# Patient Record
Sex: FEMALE | Race: WHITE | NOT HISPANIC OR LATINO | Employment: FULL TIME | URBAN - METROPOLITAN AREA
[De-identification: names, ages, dates, MRNs, and addresses within clinical notes are randomized per-mention and may not be internally consistent; named-entity substitution may affect disease eponyms.]

---

## 2017-02-16 ENCOUNTER — TRANSCRIBE ORDERS (OUTPATIENT)
Dept: ADMINISTRATIVE | Facility: HOSPITAL | Age: 47
End: 2017-02-16

## 2017-02-16 DIAGNOSIS — Z12.31 OTHER SCREENING MAMMOGRAM: Primary | ICD-10-CM

## 2017-03-02 ENCOUNTER — TRANSCRIBE ORDERS (OUTPATIENT)
Dept: LAB | Facility: CLINIC | Age: 47
End: 2017-03-02

## 2017-03-02 ENCOUNTER — LAB (OUTPATIENT)
Dept: LAB | Facility: CLINIC | Age: 47
End: 2017-03-02
Payer: COMMERCIAL

## 2017-03-02 DIAGNOSIS — Z79.899 ENCOUNTER FOR LONG-TERM (CURRENT) USE OF OTHER MEDICATIONS: ICD-10-CM

## 2017-03-02 DIAGNOSIS — L50.9 URTICARIA, UNSPECIFIED: ICD-10-CM

## 2017-03-02 DIAGNOSIS — M35.9 UNSPECIFIED DIFFUSE CONNECTIVE TISSUE DISEASE: ICD-10-CM

## 2017-03-02 DIAGNOSIS — K21.9 GASTROESOPHAGEAL REFLUX DISEASE, ESOPHAGITIS PRESENCE NOT SPECIFIED: ICD-10-CM

## 2017-03-02 DIAGNOSIS — K21.9 GASTROESOPHAGEAL REFLUX DISEASE, ESOPHAGITIS PRESENCE NOT SPECIFIED: Primary | ICD-10-CM

## 2017-03-02 LAB
ANION GAP SERPL CALCULATED.3IONS-SCNC: 10 MMOL/L (ref 4–13)
BASOPHILS # BLD AUTO: 0.02 THOUSANDS/ΜL (ref 0–0.1)
BASOPHILS NFR BLD AUTO: 0 % (ref 0–1)
BUN SERPL-MCNC: 13 MG/DL (ref 5–25)
C3 SERPL-MCNC: 147 MG/DL (ref 90–180)
C4 SERPL-MCNC: 22 MG/DL (ref 10–40)
CALCIUM SERPL-MCNC: 8.3 MG/DL (ref 8.3–10.1)
CHLORIDE SERPL-SCNC: 102 MMOL/L (ref 100–108)
CK SERPL-CCNC: 107 U/L (ref 26–192)
CO2 SERPL-SCNC: 26 MMOL/L (ref 21–32)
CREAT SERPL-MCNC: 0.89 MG/DL (ref 0.6–1.3)
CRP SERPL QL: 11.3 MG/L
EOSINOPHIL # BLD AUTO: 0.1 THOUSAND/ΜL (ref 0–0.61)
EOSINOPHIL NFR BLD AUTO: 1 % (ref 0–6)
ERYTHROCYTE [DISTWIDTH] IN BLOOD BY AUTOMATED COUNT: 13.4 % (ref 11.6–15.1)
ERYTHROCYTE [SEDIMENTATION RATE] IN BLOOD: 5 MM/HOUR (ref 0–20)
FERRITIN SERPL-MCNC: 62 NG/ML (ref 8–388)
GFR SERPL CREATININE-BSD FRML MDRD: >60 ML/MIN/1.73SQ M
GLUCOSE SERPL-MCNC: 126 MG/DL (ref 65–140)
HCT VFR BLD AUTO: 39.2 % (ref 34.8–46.1)
HGB BLD-MCNC: 12.9 G/DL (ref 11.5–15.4)
IRON SERPL-MCNC: 101 UG/DL (ref 50–170)
LYMPHOCYTES # BLD AUTO: 2.12 THOUSANDS/ΜL (ref 0.6–4.47)
LYMPHOCYTES NFR BLD AUTO: 28 % (ref 14–44)
MCH RBC QN AUTO: 31.5 PG (ref 26.8–34.3)
MCHC RBC AUTO-ENTMCNC: 32.9 G/DL (ref 31.4–37.4)
MCV RBC AUTO: 96 FL (ref 82–98)
MISCELLANEOUS LAB TEST RESULT: NORMAL
MONOCYTES # BLD AUTO: 0.34 THOUSAND/ΜL (ref 0.17–1.22)
MONOCYTES NFR BLD AUTO: 5 % (ref 4–12)
NEUTROPHILS # BLD AUTO: 4.97 THOUSANDS/ΜL (ref 1.85–7.62)
NEUTS SEG NFR BLD AUTO: 66 % (ref 43–75)
PLATELET # BLD AUTO: 247 THOUSANDS/UL (ref 149–390)
PMV BLD AUTO: 10.9 FL (ref 8.9–12.7)
POTASSIUM SERPL-SCNC: 3.6 MMOL/L (ref 3.5–5.3)
RBC # BLD AUTO: 4.1 MILLION/UL (ref 3.81–5.12)
SODIUM SERPL-SCNC: 138 MMOL/L (ref 136–145)
T4 FREE SERPL-MCNC: 0.81 NG/DL (ref 0.76–1.46)
TSH SERPL DL<=0.05 MIU/L-ACNC: 1.72 UIU/ML (ref 0.36–3.74)
WBC # BLD AUTO: 7.55 THOUSAND/UL (ref 4.31–10.16)

## 2017-03-02 PROCEDURE — 86430 RHEUMATOID FACTOR TEST QUAL: CPT

## 2017-03-02 PROCEDURE — 86140 C-REACTIVE PROTEIN: CPT

## 2017-03-02 PROCEDURE — 85652 RBC SED RATE AUTOMATED: CPT

## 2017-03-02 PROCEDURE — 36415 COLL VENOUS BLD VENIPUNCTURE: CPT

## 2017-03-02 PROCEDURE — 80048 BASIC METABOLIC PNL TOTAL CA: CPT

## 2017-03-02 PROCEDURE — 82550 ASSAY OF CK (CPK): CPT

## 2017-03-02 PROCEDURE — 86039 ANTINUCLEAR ANTIBODIES (ANA): CPT

## 2017-03-02 PROCEDURE — 82728 ASSAY OF FERRITIN: CPT

## 2017-03-02 PROCEDURE — 83520 IMMUNOASSAY QUANT NOS NONAB: CPT

## 2017-03-02 PROCEDURE — 84165 PROTEIN E-PHORESIS SERUM: CPT

## 2017-03-02 PROCEDURE — 84439 ASSAY OF FREE THYROXINE: CPT

## 2017-03-02 PROCEDURE — 84443 ASSAY THYROID STIM HORMONE: CPT

## 2017-03-02 PROCEDURE — 86038 ANTINUCLEAR ANTIBODIES: CPT

## 2017-03-02 PROCEDURE — 82595 ASSAY OF CRYOGLOBULIN: CPT

## 2017-03-02 PROCEDURE — 86200 CCP ANTIBODY: CPT

## 2017-03-02 PROCEDURE — 83540 ASSAY OF IRON: CPT

## 2017-03-02 PROCEDURE — 81374 HLA I TYPING 1 ANTIGEN LR: CPT

## 2017-03-02 PROCEDURE — 84165 PROTEIN E-PHORESIS SERUM: CPT | Performed by: INTERNAL MEDICINE

## 2017-03-02 PROCEDURE — 86225 DNA ANTIBODY NATIVE: CPT

## 2017-03-02 PROCEDURE — 86235 NUCLEAR ANTIGEN ANTIBODY: CPT

## 2017-03-02 PROCEDURE — 86160 COMPLEMENT ANTIGEN: CPT

## 2017-03-02 PROCEDURE — 85025 COMPLETE CBC W/AUTO DIFF WBC: CPT

## 2017-03-03 LAB
ANA HOMOGEN SER QL IF: NORMAL
ANA HOMOGEN TITR SER: NORMAL {TITER}
CENTROMERE B AB SER-ACNC: >8 AI (ref 0–0.9)
DSDNA AB SER-ACNC: 1 IU/ML (ref 0–9)
ENA RNP AB SER-ACNC: <0.2 AI (ref 0–0.9)
ENA SM AB SER-ACNC: <0.2 AI (ref 0–0.9)
ENA SS-A AB SER-ACNC: <0.2 AI (ref 0–0.9)
ENA SS-B AB SER-ACNC: <0.2 AI (ref 0–0.9)
RHEUMATOID FACT SER QL LA: NEGATIVE
RYE IGE QN: POSITIVE

## 2017-03-04 LAB — CCP IGA+IGG SERPL IA-ACNC: 4 UNITS (ref 0–19)

## 2017-03-05 LAB
ALBUMIN SERPL ELPH-MCNC: 3.85 G/DL (ref 3.5–5)
ALBUMIN SERPL ELPH-MCNC: 58.4 % (ref 52–65)
ALPHA1 GLOB SERPL ELPH-MCNC: 0.53 G/DL (ref 0.1–0.4)
ALPHA1 GLOB SERPL ELPH-MCNC: 8.1 % (ref 2.5–5)
ALPHA2 GLOB SERPL ELPH-MCNC: 0.72 G/DL (ref 0.4–1.2)
ALPHA2 GLOB SERPL ELPH-MCNC: 10.9 % (ref 7–13)
BETA GLOB ABNORMAL SERPL ELPH-MCNC: 0.49 G/DL (ref 0.4–0.8)
BETA1 GLOB SERPL ELPH-MCNC: 7.4 % (ref 5–13)
BETA2 GLOB SERPL ELPH-MCNC: 5.2 % (ref 2–8)
BETA2+GAMMA GLOB SERPL ELPH-MCNC: 0.34 G/DL (ref 0.2–0.5)
GAMMA GLOB ABNORMAL SERPL ELPH-MCNC: 0.66 G/DL (ref 0.5–1.6)
GAMMA GLOB SERPL ELPH-MCNC: 10 % (ref 12–22)
IGG/ALB SER: 1.4 {RATIO} (ref 1.1–1.8)
PROT PATTERN SERPL ELPH-IMP: ABNORMAL
PROT SERPL-MCNC: 6.6 G/DL (ref 6.4–8.2)

## 2017-03-06 ENCOUNTER — HOSPITAL ENCOUNTER (OUTPATIENT)
Dept: RADIOLOGY | Facility: HOSPITAL | Age: 47
Discharge: HOME | End: 2017-03-06
Attending: OBSTETRICS & GYNECOLOGY
Payer: COMMERCIAL

## 2017-03-06 DIAGNOSIS — Z12.31 OTHER SCREENING MAMMOGRAM: ICD-10-CM

## 2017-03-06 LAB
C1INH SERPL-MCNC: 32 MG/DL (ref 21–39)
CRYOGLOB SER QL 1D COLD INC: POSITIVE
MYELOPEROXIDASE AB SER IA-ACNC: <9 U/ML (ref 0–9)

## 2017-03-06 PROCEDURE — G0202 SCR MAMMO BI INCL CAD: HCPCS

## 2017-03-07 LAB — PROTEINASE3 AB SER IA-ACNC: <3.5 U/ML (ref 0–3.5)

## 2017-03-09 LAB — HLA-B27 QL NAA+PROBE: NEGATIVE

## 2017-04-03 ENCOUNTER — HOSPITAL ENCOUNTER (OUTPATIENT)
Dept: RADIOLOGY | Facility: HOSPITAL | Age: 47
Discharge: HOME | End: 2017-04-03
Attending: OBSTETRICS & GYNECOLOGY
Payer: COMMERCIAL

## 2017-04-03 DIAGNOSIS — R92.8 ABNORMAL SCREENING MAMMOGRAM: ICD-10-CM

## 2017-04-03 PROCEDURE — G0206 DX MAMMO INCL CAD UNI: HCPCS

## 2017-04-03 PROCEDURE — 76642 ULTRASOUND BREAST LIMITED: CPT

## 2018-01-12 NOTE — RESULT NOTES
Message   Please let patietn know that her MRI was essentially normal, nothing abnormal in her medial knee  Patietn to f/u as scheduled w/ Dr Gloria Leung  Verified Results  * MRI KNEE RIGHT  WO CONTRAST [id number] 04:54PM Hari PRETTY Order Number: [id number]   Performing Comments: please eval for medial joint line pain, possible medial meniscal pthology, thank yoU!   - Patient Instructions: To schedule this appointment, please contact Central Scheduling at [phone]  Test Name Result Flag Reference   MRI KNEE RIGHT  WO CONTRAST (Report)     This is a summary report  The complete report is available in the patient's medical record  If you cannot access the medical record, please contact the sending organization for a detailed fax or copy  MRI RIGHT KNEE     INDICATION: 78-year-old woman with medial joint line pain  Possible injury after stepping down off of stool last month  COMPARISON: None  TECHNIQUE:  MR sequences were obtained of the right knee including: Localizer, axial T2 fat sat, coronal T1/T2 fat sat, sagittal PD/T2 fat sat  Images were acquired on a 1 5 Julia unit  Gadolinium was not used  FINDINGS:     SUBCUTANEOUS TISSUES: Normal     JOINT EFFUSION: Small effusion  BAKER'S CYST: None  MENISCI: Intact  CRUCIATE LIGAMENTS: Intact  EXTENSOR APPARATUS: Intact  COLLATERAL LIGAMENTS: Intact  ARTICULAR SURFACES: Articular cartilage normal in thickness and signal  No focal chondral defect  BONE MARROW: Small nonspecific subchondral focus of marrow edema in the posterior portion of the medial femoral condyle, of very doubtful significance  Marrow signal otherwise normal      MUSCULATURE: Intact  IMPRESSION:     Unremarkable MRI of the right knee  No evidence of medial meniscal tear         Workstation performed: [id number]     Signed by:   Sandhya Jaramillo MD   3/22/16       Signatures   Electronically signed by : Nell Portillo MD; Mar 24 2016  1:16PM EST                       (Author)

## 2018-05-09 ENCOUNTER — APPOINTMENT (EMERGENCY)
Dept: RADIOLOGY | Facility: HOSPITAL | Age: 48
End: 2018-05-09
Payer: COMMERCIAL

## 2018-05-09 ENCOUNTER — HOSPITAL ENCOUNTER (EMERGENCY)
Facility: HOSPITAL | Age: 48
Discharge: HOME | End: 2018-05-09
Attending: EMERGENCY MEDICINE | Admitting: EMERGENCY MEDICINE
Payer: COMMERCIAL

## 2018-05-09 VITALS
TEMPERATURE: 98.3 F | OXYGEN SATURATION: 99 % | SYSTOLIC BLOOD PRESSURE: 154 MMHG | HEART RATE: 105 BPM | DIASTOLIC BLOOD PRESSURE: 89 MMHG | RESPIRATION RATE: 18 BRPM

## 2018-05-09 DIAGNOSIS — R07.9 CHEST PAIN, UNSPECIFIED TYPE: Primary | ICD-10-CM

## 2018-05-09 LAB
ALBUMIN SERPL BCP-MCNC: 3.3 G/DL (ref 3.5–5)
ALP SERPL-CCNC: 59 U/L (ref 46–116)
ALT SERPL W P-5'-P-CCNC: 25 U/L (ref 12–78)
ANION GAP SERPL CALCULATED.3IONS-SCNC: 10 MMOL/L (ref 4–13)
AST SERPL W P-5'-P-CCNC: 15 U/L (ref 5–45)
BASOPHILS # BLD AUTO: 0.01 THOUSANDS/ΜL (ref 0–0.1)
BASOPHILS NFR BLD AUTO: 0 % (ref 0–1)
BILIRUB SERPL-MCNC: 0.5 MG/DL (ref 0.2–1)
BUN SERPL-MCNC: 12 MG/DL (ref 5–25)
CALCIUM SERPL-MCNC: 8.6 MG/DL (ref 8.3–10.1)
CHLORIDE SERPL-SCNC: 104 MMOL/L (ref 100–108)
CO2 SERPL-SCNC: 26 MMOL/L (ref 21–32)
CREAT SERPL-MCNC: 0.73 MG/DL (ref 0.6–1.3)
DEPRECATED D DIMER PPP: <270 NG/ML (FEU) (ref 0–424)
EOSINOPHIL # BLD AUTO: 0.02 THOUSAND/ΜL (ref 0–0.61)
EOSINOPHIL NFR BLD AUTO: 0 % (ref 0–6)
ERYTHROCYTE [DISTWIDTH] IN BLOOD BY AUTOMATED COUNT: 13.2 % (ref 11.6–15.1)
GFR SERPL CREATININE-BSD FRML MDRD: 98 ML/MIN/1.73SQ M
GLUCOSE SERPL-MCNC: 91 MG/DL (ref 65–140)
HCT VFR BLD AUTO: 41.6 % (ref 34.8–46.1)
HGB BLD-MCNC: 13.9 G/DL (ref 11.5–15.4)
LYMPHOCYTES # BLD AUTO: 1.81 THOUSANDS/ΜL (ref 0.6–4.47)
LYMPHOCYTES NFR BLD AUTO: 26 % (ref 14–44)
MCH RBC QN AUTO: 31.7 PG (ref 26.8–34.3)
MCHC RBC AUTO-ENTMCNC: 33.4 G/DL (ref 31.4–37.4)
MCV RBC AUTO: 95 FL (ref 82–98)
MONOCYTES # BLD AUTO: 0.4 THOUSAND/ΜL (ref 0.17–1.22)
MONOCYTES NFR BLD AUTO: 6 % (ref 4–12)
NEUTROPHILS # BLD AUTO: 4.83 THOUSANDS/ΜL (ref 1.85–7.62)
NEUTS SEG NFR BLD AUTO: 68 % (ref 43–75)
PLATELET # BLD AUTO: 270 THOUSANDS/UL (ref 149–390)
PMV BLD AUTO: 10.5 FL (ref 8.9–12.7)
POTASSIUM SERPL-SCNC: 4 MMOL/L (ref 3.5–5.3)
PROT SERPL-MCNC: 6.7 G/DL (ref 6.4–8.2)
RBC # BLD AUTO: 4.39 MILLION/UL (ref 3.81–5.12)
SODIUM SERPL-SCNC: 140 MMOL/L (ref 136–145)
TROPONIN I SERPL-MCNC: <0.02 NG/ML
WBC # BLD AUTO: 7.07 THOUSAND/UL (ref 4.31–10.16)

## 2018-05-09 PROCEDURE — 36415 COLL VENOUS BLD VENIPUNCTURE: CPT | Performed by: EMERGENCY MEDICINE

## 2018-05-09 PROCEDURE — 99285 EMERGENCY DEPT VISIT HI MDM: CPT

## 2018-05-09 PROCEDURE — 71046 X-RAY EXAM CHEST 2 VIEWS: CPT

## 2018-05-09 PROCEDURE — 85025 COMPLETE CBC W/AUTO DIFF WBC: CPT | Performed by: EMERGENCY MEDICINE

## 2018-05-09 PROCEDURE — 85379 FIBRIN DEGRADATION QUANT: CPT | Performed by: EMERGENCY MEDICINE

## 2018-05-09 PROCEDURE — 93005 ELECTROCARDIOGRAM TRACING: CPT

## 2018-05-09 PROCEDURE — 84484 ASSAY OF TROPONIN QUANT: CPT | Performed by: EMERGENCY MEDICINE

## 2018-05-09 PROCEDURE — 80053 COMPREHEN METABOLIC PANEL: CPT | Performed by: EMERGENCY MEDICINE

## 2018-05-09 RX ORDER — ASPIRIN 81 MG/1
324 TABLET, CHEWABLE ORAL ONCE
Status: COMPLETED | OUTPATIENT
Start: 2018-05-09 | End: 2018-05-09

## 2018-05-09 RX ORDER — MAGNESIUM HYDROXIDE/ALUMINUM HYDROXICE/SIMETHICONE 120; 1200; 1200 MG/30ML; MG/30ML; MG/30ML
20 SUSPENSION ORAL ONCE
Status: COMPLETED | OUTPATIENT
Start: 2018-05-09 | End: 2018-05-09

## 2018-05-09 RX ADMIN — ALUMINUM HYDROXIDE, MAGNESIUM HYDROXIDE, AND SIMETHICONE 20 ML: 200; 200; 20 SUSPENSION ORAL at 11:50

## 2018-05-09 RX ADMIN — LIDOCAINE HYDROCHLORIDE 10 ML: 20 SOLUTION ORAL; TOPICAL at 11:50

## 2018-05-09 RX ADMIN — ASPIRIN 81 MG 324 MG: 81 TABLET ORAL at 10:35

## 2018-05-09 NOTE — DISCHARGE INSTRUCTIONS
Chest Pain   WHAT YOU NEED TO KNOW:   Chest pain can be caused by a range of conditions, from not serious to life-threatening  Chest pain can be a symptom of a digestive problem, such as acid reflux or a stomach ulcer  An anxiety attack or a strong emotion, such as anger, can also cause chest pain  Infection, inflammation, or a fracture in the bones or cartilage in your chest can cause pain or discomfort  Sometimes chest pain or pressure is caused by poor blood flow to your heart (angina)  Chest pain may also be caused by life-threatening conditions such as a heart attack or blood clot in your lungs  DISCHARGE INSTRUCTIONS:   Call 911 if:   · You have any of the following signs of a heart attack:      ¨ Squeezing, pressure, or pain in your chest that lasts longer than 5 minutes or returns    ¨ Discomfort or pain in your back, neck, jaw, stomach, or arm     ¨ Trouble breathing    ¨ Nausea or vomiting    ¨ Lightheadedness or a sudden cold sweat, especially with chest pain or trouble breathing    Return to the emergency department if:   · You have chest discomfort that gets worse, even with medicine  · You cough or vomit blood  · Your bowel movements are black or bloody  · You cannot stop vomiting, or it hurts to swallow  Contact your healthcare provider if:   · You have questions or concerns about your condition or care  Medicines:   · Medicines  may be given to treat the cause of your chest pain  Examples include pain medicine, anxiety medicine, or medicines to increase blood flow to your heart  · Do not take certain medicines without asking your healthcare provider first   These include NSAIDs, herbal or vitamin supplements, or hormones (estrogen or progestin)  · Take your medicine as directed  Contact your healthcare provider if you think your medicine is not helping or if you have side effects  Tell him or her if you are allergic to any medicine   Keep a list of the medicines, vitamins, and herbs you take  Include the amounts, and when and why you take them  Bring the list or the pill bottles to follow-up visits  Carry your medicine list with you in case of an emergency  Follow up with your healthcare provider within 72 hours, or as directed: You may need to return for more tests to find the cause of your chest pain  You may be referred to a specialist, such as a cardiologist or gastroenterologist  Write down your questions so you remember to ask them during your visits  Healthy living tips: The following are general healthy guidelines  If your chest pain is caused by a heart problem, your healthcare provider will give you specific guidelines to follow  · Do not smoke  Nicotine and other chemicals in cigarettes and cigars can cause lung and heart damage  Ask your healthcare provider for information if you currently smoke and need help to quit  E-cigarettes or smokeless tobacco still contain nicotine  Talk to your healthcare provider before you use these products  · Eat a variety of healthy, low-fat foods  Healthy foods include fruits, vegetables, whole-grain breads, low-fat dairy products, beans, lean meats, and fish  Ask for more information about a heart healthy diet  · Ask about activity  Your healthcare provider will tell you which activities to limit or avoid  Ask when you can drive, return to work, and have sex  Ask about the best exercise plan for you  · Maintain a healthy weight  Ask your healthcare provider how much you should weigh  Ask him or her to help you create a weight loss plan if you are overweight  · Get the flu and pneumonia vaccines  All adults should get the influenza (flu) vaccine  Get it every year as soon as it becomes available  The pneumococcal vaccine is given to adults aged 72 years or older  The vaccine is given every 5 years to prevent pneumococcal disease, such as pneumonia    © 2017 [employer] Information is for End User's use only and may not be sold, redistributed or otherwise used for commercial purposes  All illustrations and images included in CareNotes® are the copyrighted property of A D A M , Inc  or Reji Thomas  The above information is an  only  It is not intended as medical advice for individual conditions or treatments  Talk to your doctor, nurse or pharmacist before following any medical regimen to see if it is safe and effective for you

## 2018-05-09 NOTE — ED PROVIDER NOTES
History  Chief Complaint   Patient presents with    Chest Pain     pt with sudden CP 2 am this morning  Hx of hear murmur  No other symptoms  Pain when taking a deep breath  Similar incident before, had work up done but no conclusive dx or reason found for the pain  This 6-year-old female presents today with anterior chest pain  Patient states symptoms woke her from sleep at 2:00 a m  Wilma Settle Patient states it is as though there is an apple sitting in the middle of her chest   Patient also describes it as a burning sensation across her anterior chest   Patient denies any diaphoresis, vomiting, syncope, dizziness  Patient states she did have a period of shortness of breath last night, none currently  Patient had one prior episode of similar pains approximately one year ago, was seen by Cardiology and found to have no known cause of the symptoms  Patient denies any recent travel, surgery, hormone therapy  Patient is not a smoker  Patient has no history of blood clots  Patient did not try any medication at home for her symptoms  Patient states the pain is in her anterior chest and does not radiate to her back or either arm  History provided by:  Patient   used: No    Chest Pain   Pain location:  Substernal area  Pain quality: burning and pressure    Pain radiates to:  Does not radiate  Pain radiates to the back: no    Pain severity:  Moderate  Onset quality:  Sudden  Duration:  8 hours  Timing:  Constant  Progression:  Waxing and waning  Chronicity:  Recurrent  Context: at rest    Relieved by:  None tried  Exacerbated by: Laying back    Ineffective treatments:  None tried  Associated symptoms: anxiety and shortness of breath    Associated symptoms: no abdominal pain, no back pain, no cough, no diaphoresis, no dizziness, no fever, no headache, no nausea, no palpitations, not vomiting and no weakness    Risk factors: no birth control, no coronary artery disease, no diabetes mellitus, no high cholesterol, no hypertension, not obese, not pregnant, no prior DVT/PE and no smoking        None       Past Medical History:   Diagnosis Date    Heart murmur        History reviewed  No pertinent surgical history  History reviewed  No pertinent family history  I have reviewed and agree with the history as documented  Social History   Substance Use Topics    Smoking status: Never Smoker    Smokeless tobacco: Never Used    Alcohol use Yes        Review of Systems   Constitutional: Negative for activity change, appetite change, diaphoresis and fever  HENT: Negative for ear pain, facial swelling, sore throat, tinnitus and voice change  Eyes: Negative for photophobia, pain and redness  Respiratory: Positive for chest tightness and shortness of breath  Negative for cough and wheezing  Cardiovascular: Positive for chest pain  Negative for palpitations and leg swelling  Gastrointestinal: Negative for abdominal distention, abdominal pain, constipation, diarrhea, nausea and vomiting  Genitourinary: Negative for difficulty urinating, dysuria, flank pain, hematuria and urgency  Musculoskeletal: Negative for back pain, gait problem and neck pain  Skin: Negative for rash and wound  Neurological: Negative for dizziness, syncope, speech difficulty, weakness, light-headedness and headaches  Psychiatric/Behavioral: Negative for agitation, behavioral problems and confusion  Physical Exam  ED Triage Vitals [05/09/18 0957]   Temperature Pulse Respirations Blood Pressure SpO2   98 7 °F (37 1 °C) 105 18 154/89 99 %      Temp Source Heart Rate Source Patient Position - Orthostatic VS BP Location FiO2 (%)   Oral Monitor Sitting Left arm --      Pain Score       5           Orthostatic Vital Signs  Vitals:    05/09/18 0957   BP: 154/89   Pulse: 105   Patient Position - Orthostatic VS: Sitting       Physical Exam   Constitutional: She is oriented to person, place, and time   She appears well-developed and well-nourished  She is cooperative  No distress  HENT:   Head: Normocephalic and atraumatic  Mouth/Throat: Oropharynx is clear and moist    Eyes: EOM and lids are normal  Pupils are equal, round, and reactive to light  Right eye exhibits no discharge  Left eye exhibits no discharge  Right conjunctiva is not injected  Left conjunctiva is not injected  Neck: Trachea normal, normal range of motion, full passive range of motion without pain and phonation normal  Neck supple  Cardiovascular: Normal rate, regular rhythm, normal heart sounds and normal pulses  No murmur heard  Pulses:       Dorsalis pedis pulses are 2+ on the right side, and 2+ on the left side  Pulmonary/Chest: Effort normal and breath sounds normal  She exhibits no tenderness  Abdominal: Soft  She exhibits no distension  There is no tenderness  Musculoskeletal: Normal range of motion  She exhibits no edema  Neurological: She is alert and oriented to person, place, and time  She has normal strength  No cranial nerve deficit  GCS eye subscore is 4  GCS verbal subscore is 5  GCS motor subscore is 6  Skin: Skin is warm, dry and intact  Capillary refill takes less than 2 seconds  No rash noted  Psychiatric: She has a normal mood and affect  Her speech is normal and behavior is normal    Vitals reviewed        ED Medications  Medications   aspirin chewable tablet 324 mg (324 mg Oral Given 5/9/18 1035)   aluminum-magnesium hydroxide-simethicone (MYLANTA) 200-200-20 mg/5 mL oral suspension 20 mL (20 mL Oral Given 5/9/18 1150)   lidocaine viscous (XYLOCAINE) 2 % mucosal solution 10 mL (10 mL Swish & Spit Given 5/9/18 1150)       Diagnostic Studies  Results Reviewed     Procedure Component Value Units Date/Time    D-dimer, quantitative [[id number]]  (Normal) Collected:  05/09/18 1033    Lab Status:  Final result Specimen:  Blood from Arm, Right Updated:  05/09/18 1130     D-Dimer, Quant <270 ng/ml (FEU)     Comprehensive metabolic panel [[id number]]  (Abnormal) Collected:  05/09/18 1033    Lab Status:  Final result Specimen:  Blood from Arm, Right Updated:  05/09/18 1116     Sodium 140 mmol/L      Potassium 4 0 mmol/L      Chloride 104 mmol/L      CO2 26 mmol/L      Anion Gap 10 mmol/L      BUN 12 mg/dL      Creatinine 0 73 mg/dL      Glucose 91 mg/dL      Calcium 8 6 mg/dL      AST 15 U/L      ALT 25 U/L      Alkaline Phosphatase 59 U/L      Total Protein 6 7 g/dL      Albumin 3 3 (L) g/dL      Total Bilirubin 0 50 mg/dL      eGFR 98 ml/min/1 73sq m     Narrative:         National Kidney Disease Education Program recommendations are as follows:  GFR calculation is accurate only with a steady state creatinine  Chronic Kidney disease less than 60 ml/min/1 73 sq  meters  Kidney failure less than 15 ml/min/1 73 sq  meters  Troponin I [[id number]]  (Normal) Collected:  05/09/18 1033    Lab Status:  Final result Specimen:  Blood from Arm, Right Updated:  05/09/18 1108     Troponin I <0 02 ng/mL     Narrative:         [employer] Chemistry analyzer 99% cutoff is > 0 04 ng/mL in network labs    o cTnI 99% cutoff is useful only when applied to patients in the clinical setting of myocardial ischemia  o cTnI 99% cutoff should be interpreted in the context of clinical history, ECG findings and possibly cardiac imaging to establish correct diagnosis  o cTnI 99% cutoff may be suggestive but clearly not indicative of a coronary event without the clinical setting of myocardial ischemia      CBC and differential [[id number]]  (Normal) Collected:  05/09/18 1033    Lab Status:  Final result Specimen:  Blood from Arm, Right Updated:  05/09/18 1048     WBC 7 07 Thousand/uL      RBC 4 39 Million/uL      Hemoglobin 13 9 g/dL      Hematocrit 41 6 %      MCV 95 fL      MCH 31 7 pg      MCHC 33 4 g/dL      RDW 13 2 %      MPV 10 5 fL      Platelets 637 Thousands/uL      Neutrophils Relative 68 %      Lymphocytes Relative 26 %      Monocytes Relative 6 % Eosinophils Relative 0 %      Basophils Relative 0 %      Neutrophils Absolute 4 83 Thousands/µL      Lymphocytes Absolute 1 81 Thousands/µL      Monocytes Absolute 0 40 Thousand/µL      Eosinophils Absolute 0 02 Thousand/µL      Basophils Absolute 0 01 Thousands/µL                  X-ray chest 2 views   Final Result by Moustapha Willoughby MD (05/09 1058)   No acute consolidation or congestion      Workstation performed: [id number]                    Procedures  ECG 12 Lead Documentation  Date/Time: 5/9/2018 10:21 AM  Performed by: Kareen Haynes  Authorized by: Emeka CERVANTES     Indications / Diagnosis:  Chest pain  ECG reviewed by me, the ED Provider: yes    Patient location:  ED  Previous ECG:     Previous ECG:  Unavailable  Interpretation:     Interpretation: normal    Rate:     ECG rate:  90    ECG rate assessment: normal    Rhythm:     Rhythm: sinus rhythm    Ectopy:     Ectopy: none    QRS:     QRS axis:  Normal    QRS intervals:  Normal  Conduction:     Conduction: normal    ST segments:     ST segments:  Normal  T waves:     T waves: normal             Phone Contacts  ED Phone Contact    ED Course         HEART Risk Score      Most Recent Value   History  0 Filed at: 05/09/2018 1152   ECG  0 Filed at: 05/09/2018 1152   Age  1 Filed at: 05/09/2018 1152   Risk Factors  0 Filed at: 05/09/2018 1152   Troponin  0 Filed at: 05/09/2018 1152   Heart Score Risk Calculator   History  0 Filed at: 05/09/2018 1152   ECG  0 Filed at: 05/09/2018 1152   Age  1 Filed at: 05/09/2018 1152   Risk Factors  0 Filed at: 05/09/2018 1152   Troponin  0 Filed at: 05/09/2018 1152   HEART Score  1 Filed at: 05/09/2018 1152   HEART Score  1 Filed at: 05/09/2018 1152            [address] for PE      Most Recent Value   PERC Rule for PE   Age >=50  0 Filed at: 05/09/2018 1152   HR >=100  1 Filed at: 05/09/2018 1152   O2 Sat on room air < 95%  0 Filed at: 05/09/2018 1152   History of PE or DVT  0 Filed at: 05/09/2018 1152   Recent trauma or surgery  0 Filed at: 05/09/2018 1152   Hemoptysis  0 Filed at: 05/09/2018 1152   Exogenous estrogen  0 Filed at: 05/09/2018 1152   Unilateral leg swelling  0 Filed at: 05/09/2018 1152   PERC Rule for PE Results  1 Filed at: 05/09/2018 1152                Ricardo' Criteria for PE      Most Recent Value   Wells' Criteria for PE   Clinical signs and symptoms of DVT  0 Filed at: 05/09/2018 1152   PE is primary diagnosis or equally likely  0 Filed at: 05/09/2018 1152   HR >100  1 5 Filed at: 05/09/2018 1152   Immobilization at least 3 days or Surgery in the previous 4 weeks  0 Filed at: 05/09/2018 1152   Previous, objectively diagnosed PE or DVT  0 Filed at: 05/09/2018 1152   Hemoptysis  0 Filed at: 05/09/2018 1152   Malignancy with treatment within 6 months or palliative  0 Filed at: 05/09/2018 1152   Ricardo' Criteria Total  1 5 Filed at: 05/09/2018 1152            [facility]  Number of Diagnoses or Management Options  Diagnosis management comments: Patient with low risk HEART score, negative D-dimer, low risk Wells score  Patient will be discharged home to follow up with her primary care doctor         Amount and/or Complexity of Data Reviewed  Clinical lab tests: ordered and reviewed  Tests in the radiology section of CPT®: ordered and reviewed  Tests in the medicine section of CPT®: ordered and reviewed  Independent visualization of images, tracings, or specimens: yes    Risk of Complications, Morbidity, and/or Mortality  Presenting problems: high  Diagnostic procedures: high  Management options: moderate    Patient Progress  Patient progress: stable    CritCare Time    Disposition  Final diagnoses:   Chest pain, unspecified type     Time reflects when diagnosis was documented in both MDM as applicable and the Disposition within this note     Time User Action Codes Description Comment    5/9/2018 11:54 AM Allie Becerra Add [R07 9] Chest pain, unspecified type       ED Disposition     ED Disposition Condition Comment Discharge  Marcie Goncalves discharge to home/self care  Condition at discharge: Stable        Follow-up Information     Follow up With Specialties Details Why [address] [facility], DO Family Medicine Call in 2 days For further evaluation of this chest pain should it continue [address] [facility]  [phone]          Patient's Medications    No medications on file     No discharge procedures on file      ED Provider  Electronically Signed by           Akbar Knapp MD  05/09/18 2429

## 2018-05-10 ENCOUNTER — TELEPHONE (OUTPATIENT)
Dept: ADMINISTRATIVE | Facility: OTHER | Age: 48
End: 2018-05-10

## 2018-05-10 LAB
ATRIAL RATE: 90 BPM
P AXIS: 72 DEGREES
PR INTERVAL: 114 MS
QRS AXIS: 78 DEGREES
QRSD INTERVAL: 88 MS
QT INTERVAL: 374 MS
QTC INTERVAL: 457 MS
T WAVE AXIS: 65 DEGREES
VENTRICULAR RATE: 90 BPM

## 2018-05-10 PROCEDURE — 93010 ELECTROCARDIOGRAM REPORT: CPT | Performed by: INTERNAL MEDICINE

## 2018-05-10 NOTE — TELEPHONE ENCOUNTER
LMOM for patient to call me  Last OV at [facility] was in 2014  Need to find out if she still considers [facility] her PCP office and if she does, does she want to make a follow up appointment for this ED visit  ED visit documented in ED log

## 2019-02-18 ENCOUNTER — TRANSCRIBE ORDERS (OUTPATIENT)
Dept: ADMINISTRATIVE | Facility: HOSPITAL | Age: 49
End: 2019-02-18

## 2019-02-18 DIAGNOSIS — N93.9 VAGINAL HEMORRHAGE: Primary | ICD-10-CM

## 2019-02-25 ENCOUNTER — HOSPITAL ENCOUNTER (OUTPATIENT)
Dept: RADIOLOGY | Facility: HOSPITAL | Age: 49
Discharge: HOME | End: 2019-02-25
Attending: OBSTETRICS & GYNECOLOGY

## 2019-02-25 ENCOUNTER — HOSPITAL ENCOUNTER (OUTPATIENT)
Dept: RADIOLOGY | Facility: HOSPITAL | Age: 49
Discharge: HOME | End: 2019-02-25
Attending: OBSTETRICS & GYNECOLOGY
Payer: COMMERCIAL

## 2019-02-25 DIAGNOSIS — N93.9 VAGINAL HEMORRHAGE: ICD-10-CM

## 2019-02-25 PROCEDURE — 58340 CATHETER FOR HYSTEROGRAPHY: CPT

## 2019-02-25 PROCEDURE — 76831 ECHO EXAM UTERUS: CPT

## 2019-02-25 PROCEDURE — 76830 TRANSVAGINAL US NON-OB: CPT

## 2019-04-25 ENCOUNTER — TELEPHONE (OUTPATIENT)
Dept: FAMILY MEDICINE CLINIC | Facility: CLINIC | Age: 49
End: 2019-04-25

## 2019-05-22 ENCOUNTER — OFFICE VISIT (OUTPATIENT)
Dept: OBGYN CLINIC | Facility: CLINIC | Age: 49
End: 2019-05-22
Payer: COMMERCIAL

## 2019-05-22 ENCOUNTER — APPOINTMENT (OUTPATIENT)
Dept: RADIOLOGY | Facility: CLINIC | Age: 49
End: 2019-05-22
Payer: COMMERCIAL

## 2019-05-22 VITALS
HEIGHT: 67 IN | HEART RATE: 83 BPM | SYSTOLIC BLOOD PRESSURE: 125 MMHG | BODY MASS INDEX: 28.25 KG/M2 | WEIGHT: 180 LBS | DIASTOLIC BLOOD PRESSURE: 85 MMHG

## 2019-05-22 DIAGNOSIS — M76.72 PERONEAL TENDINITIS OF LEFT LOWER LEG: Primary | ICD-10-CM

## 2019-05-22 DIAGNOSIS — IMO0001 SUBLUXATION OF PERONEAL TENDON OF LEFT FOOT, INITIAL ENCOUNTER: ICD-10-CM

## 2019-05-22 DIAGNOSIS — M79.672 PAIN IN LEFT FOOT: ICD-10-CM

## 2019-05-22 PROCEDURE — 73630 X-RAY EXAM OF FOOT: CPT

## 2019-05-22 PROCEDURE — 99203 OFFICE O/P NEW LOW 30 MIN: CPT | Performed by: ORTHOPAEDIC SURGERY

## 2020-03-02 ENCOUNTER — OFFICE VISIT (OUTPATIENT)
Dept: URGENT CARE | Facility: CLINIC | Age: 50
End: 2020-03-02
Payer: COMMERCIAL

## 2020-03-02 ENCOUNTER — APPOINTMENT (OUTPATIENT)
Dept: RADIOLOGY | Facility: CLINIC | Age: 50
End: 2020-03-02
Payer: COMMERCIAL

## 2020-03-02 VITALS
OXYGEN SATURATION: 99 % | BODY MASS INDEX: 28.01 KG/M2 | RESPIRATION RATE: 20 BRPM | TEMPERATURE: 99.1 F | SYSTOLIC BLOOD PRESSURE: 114 MMHG | HEIGHT: 68 IN | DIASTOLIC BLOOD PRESSURE: 70 MMHG | HEART RATE: 96 BPM | WEIGHT: 184.8 LBS

## 2020-03-02 DIAGNOSIS — S20.211A RIB CONTUSION, RIGHT, INITIAL ENCOUNTER: Primary | ICD-10-CM
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
## 2020-03-02 PROCEDURE — 99214 OFFICE O/P EST MOD 30 MIN: CPT | Performed by: PHYSICIAN ASSISTANT

## 2020-03-02 PROCEDURE — 71101 X-RAY EXAM UNILAT RIBS/CHEST: CPT

## 2020-03-02 PROCEDURE — 3008F BODY MASS INDEX DOCD: CPT | Performed by: PHYSICIAN ASSISTANT

## 2020-03-02 RX ORDER — HYDROXYCHLOROQUINE SULFATE 200 MG/1
TABLET, FILM COATED ORAL
COMMUNITY
Start: 2020-02-19 | End: 2022-03-04

## 2020-03-02 RX ORDER — HYDROXYZINE HYDROCHLORIDE 10 MG/1
10 TABLET, FILM COATED ORAL EVERY 6 HOURS PRN
COMMUNITY

## 2020-03-02 RX ORDER — PANTOPRAZOLE SODIUM 40 MG/1
TABLET, DELAYED RELEASE ORAL
COMMUNITY
Start: 2019-11-29 | End: 2022-03-04

## 2020-03-02 RX ORDER — ERGOCALCIFEROL 1.25 MG/1
CAPSULE ORAL
COMMUNITY
Start: 2020-02-19 | End: 2022-03-04

## 2020-03-02 RX ORDER — PREDNISONE 1 MG/1
TABLET ORAL
COMMUNITY
Start: 2020-02-19 | End: 2022-03-04

## 2020-03-02 NOTE — PROGRESS NOTES
[facility]        NAME: Ziola Frank is a 51 y o  female  : 1970    MRN: [id number]  DATE: 2020  TIME: 1:22 PM    Assessment and Plan   Rib contusion, right, initial encounter [V12 650B]  1  Rib contusion, right, initial encounter     2  Fall, initial encounter  XR ribs right w pa chest min 3 views         Patient Instructions     Right Chest wall contusion:   -There is no obvious sign of dislocation or fracture on X-ray  Awaiting official read  -Ice the area for 20 minutes 3-4 times a day  -Encouraged deep breathing ten times an hour to aid with healing and to prevent lung collapse or infection    -You can take Advil or Tylenol for the pain  -You can do very light stretching and massage of the area  -Avoid strenuous activity/sports or gym until your symptoms improve  -Follow up with your PCP For further evaluation and management if your pain persist  If your pain or sx worsen follow up in the ED  Follow up with PCP in 3-5 days  Proceed to  ER if symptoms worsen  Chief Complaint     Chief Complaint   Patient presents with    Rib Pain     Pt here for rib pain pt states she fell, last night on the stairs   Pt had upper right chest/ rib pain,   right lower leg pain and upper left arm pain  Pt states short of breath and chest presure  Pt used Advil  History of Present Illness       The patient presents today for right sided chest wall pain s/p a fall one day ago  She states that she was walking up the stairs last night with a laundry basket when she tripped on a stair and fell forward onto her right side  She states that she impacted her right anterior chest wall on the stair case  She states that she was also experiencing right lower leg pain and left upper arm pain but feels that the pain has greatly improved today and is a 1/10 in intensity  She states that she has mild ecchymosis over the left upper arm   There is dyspnea and chest pressure and tightness with deep breathing  She denies wheezing, cough, hemoptysis, dizziness, weakness, palpitations  She has been taking Advil for the pain with moderate relief  She denies LOC or head injury  Review of Systems   Review of Systems   Constitutional: Negative for activity change, appetite change, chills, fatigue and fever  Respiratory: Positive for chest tightness and shortness of breath  Negative for apnea, cough, choking, wheezing and stridor  Cardiovascular: Positive for chest pain  Negative for palpitations and leg swelling  Musculoskeletal: Positive for arthralgias and joint swelling  Negative for back pain, gait problem, myalgias, neck pain and neck stiffness  Skin: Positive for color change  Negative for pallor, rash and wound  Allergic/Immunologic: Negative for immunocompromised state  Neurological: Negative for dizziness, weakness, light-headedness and numbness  Hematological: Does not bruise/bleed easily           Current Medications       Current Outpatient Medications:     ergocalciferol (VITAMIN D2) 50,000 units, , Disp: , Rfl:     hydroxychloroquine (PLAQUENIL) 200 mg tablet, , Disp: , Rfl:     hydrOXYzine HCL (ATARAX) 10 mg tablet, Take 10 mg by mouth every 6 (six) hours as needed for itching, Disp: , Rfl:     pantoprazole (PROTONIX) 40 mg tablet, , Disp: , Rfl:     predniSONE 5 mg tablet, , Disp: , Rfl:     Current Allergies     Allergies as of 03/02/2020    (No Known Allergies)            The following portions of the patient's history were reviewed and updated as appropriate: allergies, current medications, past family history, past medical history, past social history, past surgical history and problem list      Past Medical History:   Diagnosis Date    Connective tissue disease ([address] )     Heart murmur     Scoliosis        Past Surgical History:   Procedure Laterality Date    OTHER SURGICAL HISTORY      ovarian tumer removed, benign    OVARIAN CYST REMOVAL         Family History Problem Relation Age of Onset    No Known Problems Mother     No Known Problems Father     No Known Problems Sister     No Known Problems Brother     No Known Problems Maternal Aunt     No Known Problems Maternal Uncle     No Known Problems Paternal Aunt     No Known Problems Paternal Uncle     No Known Problems Maternal Grandmother     No Known Problems Maternal Grandfather     No Known Problems Paternal Grandmother     No Known Problems Paternal Grandfather     ADD / ADHD Neg Hx     Anesthesia problems Neg Hx     Cancer Neg Hx     Clotting disorder Neg Hx     Collagen disease Neg Hx     Diabetes Neg Hx     Dislocations Neg Hx     Learning disabilities Neg Hx     Neurological problems Neg Hx     Osteoporosis Neg Hx     Rheumatologic disease Neg Hx     Scoliosis Neg Hx     Vascular Disease Neg Hx          Medications have been verified  Objective   /70 (BP Location: Right arm, Patient Position: Sitting, Cuff Size: Standard)   Pulse 96   Temp 99 1 °F (37 3 °C) (Tympanic)   Resp 20   Ht 5' 8" (1 727 m)   Wt 83 8 kg (184 lb 12 8 oz)   SpO2 99%   BMI 28 10 kg/m²        Physical Exam     Physical Exam   Constitutional: She appears well-developed and well-nourished  No distress  Cardiovascular: Normal rate, regular rhythm and normal heart sounds  Pulmonary/Chest: Effort normal and breath sounds normal  Chest wall is not dull to percussion  She exhibits tenderness (There is mild tenderness over the anterior right 4th and 5th ribs  No ecchymosis, edema or erythema  No crepitus  )  She exhibits no mass, no bony tenderness, no laceration, no crepitus, no edema, no deformity, no swelling and no retraction  Musculoskeletal:        Left upper arm: She exhibits no tenderness, no bony tenderness, no swelling, no edema, no deformity and no laceration  Right lower leg: Normal    Neurological: She has normal strength  No sensory deficit  She exhibits normal muscle tone   Gait normal    Skin: Skin is warm, dry and intact  Capillary refill takes less than 2 seconds  Ecchymosis (There is mild ecchymosis over the bicep muscle of the left upper arm  No tenderness  Full ROM of the arm without pain  ) noted  No bruising noted  She is not diaphoretic  No erythema  Psychiatric: She has a normal mood and affect  Her behavior is normal    Nursing note and vitals reviewed

## 2020-03-02 NOTE — PATIENT INSTRUCTIONS
Rib Contusion   WHAT YOU NEED TO KNOW:   A rib contusion is a bruise on one or more of your ribs  DISCHARGE INSTRUCTIONS:   Return to the emergency department if:   · You have increased chest pain  · You have shortness of breath  · You start to cough up blood  · Your pain does not improve with pain medicine  Contact your healthcare provider if:   · You have a cough  · You have a fever  · You have questions or concerns about your condition or care  Medicines: You may need any of the following:  · NSAIDs , such as ibuprofen, help decrease swelling, pain, and fever  This medicine is available with or without a doctor's order  NSAIDs can cause stomach bleeding or kidney problems in certain people  If you take blood thinner medicine, always ask if NSAIDs are safe for you  Always read the medicine label and follow directions  Do not give these medicines to children under 10months of age without direction from your child's healthcare provider  · Prescription pain medicine  may be given  Ask how to take this medicine safely  · Take your medicine as directed  Contact your healthcare provider if you think your medicine is not helping or if you have side effects  Tell him of her if you are allergic to any medicine  Keep a list of the medicines, vitamins, and herbs you take  Include the amounts, and when and why you take them  Bring the list or the pill bottles to follow-up visits  Carry your medicine list with you in case of an emergency  Deep breathing:   · To help prevent pneumonia, take 10 deep breaths every hour, even when you wake up during the night  Brace your ribs with your hands or a pillow while you take deep breaths or cough  This will help decrease your pain  · You may need to use an incentive spirometer to help you take deeper breaths  Put the plastic piece into your mouth and take a very deep breath  Hold your breath as long as you can  Then let out your breath   Do this 10 times in a row every hour while you are awake  Rest:  Rest your ribs to decrease swelling and allow the injury to heal faster  Avoid activities that may cause more pain or damage to your ribs  As your pain decreases, begin movements slowly  Ice:  Ice helps decrease swelling and pain  Ice may also help prevent tissue damage  Use an ice pack or put crushed ice in a plastic bag  Cover it with a towel and place it on your bruised area for 15 to 20 minutes every hour as directed  Follow up with your healthcare provider as directed:  Write down your questions so you remember to ask them during your visits  © 2017 [address] Information is for End User's use only and may not be sold, redistributed or otherwise used for commercial purposes  All illustrations and images included in CareNotes® are the copyrighted property of [employer]  or Reji Thomas  The above information is an  only  It is not intended as medical advice for individual conditions or treatments  Talk to your doctor, nurse or pharmacist before following any medical regimen to see if it is safe and effective for you  Right Chest wall contusion:   -There is no obvious sign of dislocation or fracture on X-ray  Awaiting official read  -Ice the area for 20 minutes 3-4 times a day  -Encouraged deep breathing ten times an hour to aid with healing and to prevent lung collapse or infection    -You can take Advil or Tylenol for the pain  -You can do very light stretching and massage of the area  -Avoid strenuous activity/sports or gym until your symptoms improve  -Follow up with your PCP For further evaluation and management if your pain persist  If your pain or sx worsen follow up in the ED

## 2020-04-30 ENCOUNTER — TELEPHONE (OUTPATIENT)
Dept: INTERNAL MEDICINE CLINIC | Facility: CLINIC | Age: 50
End: 2020-04-30

## 2020-05-01 ENCOUNTER — TELEMEDICINE (OUTPATIENT)
Dept: FAMILY MEDICINE CLINIC | Facility: CLINIC | Age: 50
End: 2020-05-01
Payer: COMMERCIAL

## 2020-05-01 DIAGNOSIS — M54.17 LUMBOSACRAL RADICULOPATHY: Primary | ICD-10-CM

## 2020-05-01 DIAGNOSIS — Z86.19 HISTORY OF HEPATITIS C: ICD-10-CM

## 2020-05-01 DIAGNOSIS — M35.9 CONNECTIVE TISSUE DISEASE (HCC): ICD-10-CM

## 2020-05-01 PROBLEM — E55.9 VITAMIN D DEFICIENCY: Status: ACTIVE | Noted: 2020-05-01

## 2020-05-01 PROCEDURE — 99442 PR PHYS/QHP TELEPHONE EVALUATION 11-20 MIN: CPT | Performed by: FAMILY MEDICINE

## 2020-05-01 RX ORDER — GABAPENTIN 100 MG/1
100 CAPSULE ORAL 3 TIMES DAILY
Qty: 30 CAPSULE | Refills: 3 | Status: SHIPPED | OUTPATIENT
Start: 2020-05-01 | End: 2021-02-25

## 2020-05-01 RX ORDER — CYCLOBENZAPRINE HCL 10 MG
10 TABLET ORAL 3 TIMES DAILY PRN
Qty: 30 TABLET | Refills: 3 | Status: SHIPPED | OUTPATIENT
Start: 2020-05-01 | End: 2021-02-25

## 2020-05-01 RX ORDER — DICLOFENAC SODIUM 75 MG/1
75 TABLET, DELAYED RELEASE ORAL 2 TIMES DAILY
Qty: 60 TABLET | Refills: 3 | Status: SHIPPED | OUTPATIENT
Start: 2020-05-01 | End: 2021-07-22

## 2020-12-22 ENCOUNTER — TELEMEDICINE (OUTPATIENT)
Dept: FAMILY MEDICINE CLINIC | Facility: CLINIC | Age: 50
End: 2020-12-22
Payer: COMMERCIAL

## 2020-12-22 VITALS — WEIGHT: 173 LBS | BODY MASS INDEX: 26.22 KG/M2 | TEMPERATURE: 98 F | HEIGHT: 68 IN

## 2020-12-22 DIAGNOSIS — Z20.822 EXPOSURE TO COVID-19 VIRUS: ICD-10-CM

## 2020-12-22 DIAGNOSIS — Z11.9 ENCOUNTER FOR SCREENING FOR INFECTIOUS AND PARASITIC DISEASES, UNSPECIFIED: ICD-10-CM

## 2020-12-22 PROCEDURE — U0003 INFECTIOUS AGENT DETECTION BY NUCLEIC ACID (DNA OR RNA); SEVERE ACUTE RESPIRATORY SYNDROME CORONAVIRUS 2 (SARS-COV-2) (CORONAVIRUS DISEASE [COVID-19]), AMPLIFIED PROBE TECHNIQUE, MAKING USE OF HIGH THROUGHPUT TECHNOLOGIES AS DESCRIBED BY CMS-2020-01-R: HCPCS | Performed by: NURSE PRACTITIONER

## 2020-12-22 PROCEDURE — 99213 OFFICE O/P EST LOW 20 MIN: CPT | Performed by: NURSE PRACTITIONER

## 2020-12-23 LAB — SARS-COV-2 RNA SPEC QL NAA+PROBE: NOT DETECTED

## 2020-12-28 ENCOUNTER — TELEMEDICINE (OUTPATIENT)
Dept: FAMILY MEDICINE CLINIC | Facility: CLINIC | Age: 50
End: 2020-12-28
Payer: COMMERCIAL

## 2020-12-28 VITALS — HEIGHT: 68 IN | BODY MASS INDEX: 26.22 KG/M2 | WEIGHT: 173 LBS | TEMPERATURE: 98.6 F

## 2020-12-28 DIAGNOSIS — J01.00 ACUTE NON-RECURRENT MAXILLARY SINUSITIS: ICD-10-CM

## 2020-12-28 DIAGNOSIS — B34.9 VIRAL INFECTION, UNSPECIFIED: ICD-10-CM

## 2020-12-28 DIAGNOSIS — Z20.822 EXPOSURE TO COVID-19 VIRUS: ICD-10-CM

## 2020-12-28 DIAGNOSIS — Z20.822 EXPOSURE TO COVID-19 VIRUS: Primary | ICD-10-CM

## 2020-12-28 PROCEDURE — 87637 SARSCOV2&INF A&B&RSV AMP PRB: CPT | Performed by: NURSE PRACTITIONER

## 2020-12-28 PROCEDURE — 99213 OFFICE O/P EST LOW 20 MIN: CPT | Performed by: NURSE PRACTITIONER

## 2020-12-28 RX ORDER — AMOXICILLIN 875 MG/1
875 TABLET, COATED ORAL 2 TIMES DAILY
Qty: 20 TABLET | Refills: 0 | Status: SHIPPED | OUTPATIENT
Start: 2020-12-28 | End: 2021-01-07

## 2020-12-29 LAB
FLUAV RNA NPH QL NAA+PROBE: NOT DETECTED
FLUBV RNA NPH QL NAA+PROBE: NOT DETECTED
RSV RNA NPH QL NAA+PROBE: NOT DETECTED
SARS-COV-2 RNA NPH QL NAA+PROBE: DETECTED

## 2020-12-31 ENCOUNTER — TELEMEDICINE (OUTPATIENT)
Dept: FAMILY MEDICINE CLINIC | Facility: CLINIC | Age: 50
End: 2020-12-31
Payer: COMMERCIAL

## 2020-12-31 DIAGNOSIS — U07.1 LAB TEST POSITIVE FOR DETECTION OF COVID-19 VIRUS: Primary | ICD-10-CM

## 2020-12-31 PROCEDURE — 99212 OFFICE O/P EST SF 10 MIN: CPT | Performed by: NURSE PRACTITIONER

## 2021-02-17 ENCOUNTER — OFFICE VISIT (OUTPATIENT)
Dept: URGENT CARE | Facility: CLINIC | Age: 51
End: 2021-02-17
Payer: COMMERCIAL

## 2021-02-17 VITALS
OXYGEN SATURATION: 99 % | BODY MASS INDEX: 26.52 KG/M2 | RESPIRATION RATE: 16 BRPM | HEIGHT: 68 IN | TEMPERATURE: 96.4 F | WEIGHT: 175 LBS | SYSTOLIC BLOOD PRESSURE: 142 MMHG | HEART RATE: 89 BPM | DIASTOLIC BLOOD PRESSURE: 90 MMHG

## 2021-02-17 DIAGNOSIS — M54.6 RIGHT-SIDED THORACIC BACK PAIN, UNSPECIFIED CHRONICITY: Primary | ICD-10-CM

## 2021-02-17 PROCEDURE — 96372 THER/PROPH/DIAG INJ SC/IM: CPT | Performed by: NURSE PRACTITIONER

## 2021-02-17 PROCEDURE — G0382 LEV 3 HOSP TYPE B ED VISIT: HCPCS | Performed by: NURSE PRACTITIONER

## 2021-02-17 RX ORDER — PREDNISONE 10 MG/1
10 TABLET ORAL DAILY
Qty: 21 TABLET | Refills: 0 | Status: SHIPPED | OUTPATIENT
Start: 2021-02-17 | End: 2022-03-04

## 2021-02-17 RX ORDER — METHOCARBAMOL 500 MG/1
500 TABLET, FILM COATED ORAL 4 TIMES DAILY
Qty: 20 TABLET | Refills: 0 | Status: SHIPPED | OUTPATIENT
Start: 2021-02-17 | End: 2021-02-25

## 2021-02-17 RX ORDER — KETOROLAC TROMETHAMINE 30 MG/ML
30 INJECTION, SOLUTION INTRAMUSCULAR; INTRAVENOUS ONCE
Status: COMPLETED | OUTPATIENT
Start: 2021-02-17 | End: 2021-02-17

## 2021-02-17 RX ADMIN — KETOROLAC TROMETHAMINE 30 MG: 30 INJECTION, SOLUTION INTRAMUSCULAR; INTRAVENOUS at 18:43

## 2021-02-17 NOTE — PATIENT INSTRUCTIONS
Stop Flexeril and Diclofenac  You can continue Gabapentin as directed    Start Prednisone 2/18 in the AM  Take with a full glass of water and food   Do not use ibuprofen, aleve, or diclofenac while taking Prednisone  Robaxin every 6 hours as needed for muscle spasm- for home use only may cause drowsiness    Alternate ice and heat   Follow up with orthopedics as scheduled     Back Pain   WHAT YOU NEED TO KNOW:   Back pain is common  It can be caused by many conditions, such as arthritis or the breakdown of spinal discs  Your risk for back pain is increased by injuries, lack of activity, or repeated bending and twisting  You may feel sore or stiff on one or both sides of your back  The pain may spread to your buttocks or thighs  DISCHARGE INSTRUCTIONS:   Return to the emergency department if:   · You have pain, numbness, or weakness in one or both legs  · Your pain becomes so severe that you cannot walk  · You cannot control your urine or bowel movements  · You have severe back pain with chest pain  · You have severe back pain, nausea, and vomiting  · You have severe back pain that spreads to your side or genital area  Contact your healthcare provider if:   · You have back pain that does not get better with rest and pain medicine  · You have a fever  · You have pain that worsens when you are on your back or when you rest     · You have pain that worsens when you cough or sneeze  · You lose weight without trying  · You have questions or concerns about your condition or care  Medicines:   · NSAIDs  help decrease swelling and pain  This medicine is available with or without a doctor's order  NSAIDs can cause stomach bleeding or kidney problems in certain people  If you take blood thinner medicine, always ask your healthcare provider if NSAIDs are safe for you  Always read the medicine label and follow directions  · Acetaminophen  decreases pain and fever   It is available without a Try Genteal gel at night before going to sleep   doctor's order  Ask how much to take and how often to take it  Follow directions  Read the labels of all other medicines you are using to see if they also contain acetaminophen, or ask your doctor or pharmacist  Acetaminophen can cause liver damage if not taken correctly  Do not use more than 4 grams (4,000 milligrams) total of acetaminophen in one day  · Muscle relaxers  help decrease muscle spasms and back pain  · Prescription pain medicine  may be given  Ask your healthcare provider how to take this medicine safely  Some prescription pain medicines contain acetaminophen  Do not take other medicines that contain acetaminophen without talking to your healthcare provider  Too much acetaminophen may cause liver damage  Prescription pain medicine may cause constipation  Ask your healthcare provider how to prevent or treat constipation  · Take your medicine as directed  Contact your healthcare provider if you think your medicine is not helping or if you have side effects  Tell him or her if you are allergic to any medicine  Keep a list of the medicines, vitamins, and herbs you take  Include the amounts, and when and why you take them  Bring the list or the pill bottles to follow-up visits  Carry your medicine list with you in case of an emergency  How to manage your back pain:   · Apply ice  on your back for 15 to 20 minutes every hour or as directed  Use an ice pack, or put crushed ice in a plastic bag  Cover it with a towel before you apply it to your skin  Ice helps prevent tissue damage and decreases pain  · Apply heat  on your back for 20 to 30 minutes every 2 hours for as many days as directed  Heat helps decrease pain and muscle spasms  · Stay active  as much as you can without causing more pain  Bed rest could make your back pain worse  Avoid heavy lifting until your pain is gone  · Go to physical therapy as directed    A physical therapist can teach you exercises to help improve movement and strength, and to decrease pain  Follow up with your healthcare provider in 2 weeks, or as directed:  Write down your questions so you remember to ask them during your visits  © Copyright [address] Information is for End User's use only and may not be sold, redistributed or otherwise used for commercial purposes  All illustrations and images included in CareNotes® are the copyrighted property of A D A M , Inc  or [facility] Laura Dhaliwal  The above information is an  only  It is not intended as medical advice for individual conditions or treatments  Talk to your doctor, nurse or pharmacist before following any medical regimen to see if it is safe and effective for you

## 2021-02-17 NOTE — PROGRESS NOTES
[facility]        NAME: Mina Lombardo is a 53 y o  female  : 1970    MRN: [id number]  DATE: 2021  TIME: 6:53 PM    Assessment and Plan   Right-sided thoracic back pain, unspecified chronicity [M54 6]  1  Right-sided thoracic back pain, unspecified chronicity  predniSONE 10 mg tablet    methocarbamol (ROBAXIN) 500 mg tablet    ketorolac (TORADOL) injection 30 mg         Patient Instructions   Stop Flexeril and Diclofenac  You can continue Gabapentin as directed    Start Prednisone  in the AM  Take with a full glass of water and food   Do not use ibuprofen, aleve, or diclofenac while taking Prednisone  Robaxin every 6 hours as needed for muscle spasm- for home use only may cause drowsiness    Alternate ice and heat   Follow up with orthopedics as scheduled     Follow up with PCP in 3-5 days  Proceed to  ER if symptoms worsen  Chief Complaint     Chief Complaint   Patient presents with    Arm Pain     Pt reports that she fell 2 times in the last 2 months- pt fell in shower and on ice and now she is having a pinching in her right shoulder/she feels like it is tingling and burning         History of Present Illness       Patient is a 53year old female presenting with right upper back pain  Pain worsened after shoveling snow 2 weeks ago  She has occasional tingling in the 4th and 5th digit of the right hand  She has full ROM  She started flexeril, diclofenac, and gabapentin without improvement  She made an orthopedic appointment for Friday but due to pending weather she is concerned she won't be able to go to the appointment  Review of Systems   Review of Systems   Constitutional: Negative for activity change, chills and fever  Respiratory: Negative for cough and shortness of breath  Musculoskeletal: Positive for back pain and neck pain  Negative for arthralgias, joint swelling, myalgias and neck stiffness  Skin: Negative for color change, rash and wound  Neurological: Negative for dizziness, weakness, numbness and headaches  Current Medications       Current Outpatient Medications:     cyclobenzaprine (FLEXERIL) 10 mg tablet, Take 1 tablet (10 mg total) by mouth 3 (three) times a day as needed for muscle spasms, Disp: 30 tablet, Rfl: 3    diclofenac (VOLTAREN) 75 mg EC tablet, Take 1 tablet (75 mg total) by mouth 2 (two) times a day, Disp: 60 tablet, Rfl: 3    ergocalciferol (VITAMIN D2) 50,000 units, , Disp: , Rfl:     gabapentin (NEURONTIN) 100 mg capsule, Take 1 capsule (100 mg total) by mouth 3 (three) times a day, Disp: 30 capsule, Rfl: 3    hydrOXYzine HCL (ATARAX) 10 mg tablet, Take 10 mg by mouth every 6 (six) hours as needed for itching, Disp: , Rfl:     hydroxychloroquine (PLAQUENIL) 200 mg tablet, , Disp: , Rfl:     methocarbamol (ROBAXIN) 500 mg tablet, Take 1 tablet (500 mg total) by mouth 4 (four) times a day, Disp: 20 tablet, Rfl: 0    pantoprazole (PROTONIX) 40 mg tablet, , Disp: , Rfl:     predniSONE 10 mg tablet, Take 1 tablet (10 mg total) by mouth daily 60mg days 1, 50mg day 2, 40mg day 3, 30mg day 4, 20 mg day 5, 10mg day 6 , Disp: 21 tablet, Rfl: 0    predniSONE 5 mg tablet, , Disp: , Rfl:   No current facility-administered medications for this visit       Current Allergies     Allergies as of 02/17/2021    (No Known Allergies)            The following portions of the patient's history were reviewed and updated as appropriate: allergies, current medications, past family history, past medical history, past social history, past surgical history and problem list      Past Medical History:   Diagnosis Date    Connective tissue disease ([address] )     Heart murmur     History of hepatitis C 5/1/2020    Lumbosacral radiculopathy 5/1/2020    MRSA (methicillin resistant Staphylococcus aureus)     X2    Scoliosis     Vitamin D deficiency 5/1/2020       Past Surgical History:   Procedure Laterality Date    OTHER SURGICAL HISTORY 2014    ovarian tumer removed, benign    OVARIAN CYST REMOVAL  2009       Family History   Problem Relation Age of Onset    No Known Problems Mother     No Known Problems Father     No Known Problems Sister     No Known Problems Brother     No Known Problems Maternal Aunt     No Known Problems Maternal Uncle     No Known Problems Paternal Aunt     No Known Problems Paternal Uncle     Ovarian cancer Maternal Grandmother     No Known Problems Maternal Grandfather     No Known Problems Paternal Grandmother     No Known Problems Paternal Grandfather     ADD / ADHD Neg Hx     Anesthesia problems Neg Hx     Cancer Neg Hx     Clotting disorder Neg Hx     Collagen disease Neg Hx     Diabetes Neg Hx     Dislocations Neg Hx     Learning disabilities Neg Hx     Neurological problems Neg Hx     Osteoporosis Neg Hx     Rheumatologic disease Neg Hx     Scoliosis Neg Hx     Vascular Disease Neg Hx          Medications have been verified  Objective   /90   Pulse 89   Temp (!) 96 4 °F (35 8 °C)   Resp 16   Ht 5' 7 5" (1 715 m)   Wt 79 4 kg (175 lb)   SpO2 99%   BMI 27 00 kg/m²        Physical Exam     Physical Exam  Vitals signs reviewed  Constitutional:       General: She is awake  She is not in acute distress  Appearance: Normal appearance  She is normal weight  Cardiovascular:      Rate and Rhythm: Normal rate and regular rhythm  Heart sounds: Normal heart sounds, S1 normal and S2 normal    Pulmonary:      Effort: Pulmonary effort is normal       Breath sounds: Normal breath sounds  No decreased breath sounds, wheezing, rhonchi or rales  Musculoskeletal:        Arms:    Skin:     General: Skin is warm and moist    Neurological:      General: No focal deficit present  Mental Status: She is alert, oriented to person, place, and time and easily aroused  Psychiatric:         Behavior: Behavior is cooperative

## 2021-02-25 ENCOUNTER — OFFICE VISIT (OUTPATIENT)
Dept: OBGYN CLINIC | Facility: OTHER | Age: 51
End: 2021-02-25
Payer: COMMERCIAL

## 2021-02-25 ENCOUNTER — APPOINTMENT (OUTPATIENT)
Dept: RADIOLOGY | Facility: OTHER | Age: 51
End: 2021-02-25
Payer: COMMERCIAL

## 2021-02-25 VITALS
DIASTOLIC BLOOD PRESSURE: 88 MMHG | BODY MASS INDEX: 27.59 KG/M2 | WEIGHT: 178.8 LBS | HEART RATE: 96 BPM | SYSTOLIC BLOOD PRESSURE: 131 MMHG

## 2021-02-25 DIAGNOSIS — M54.12 CERVICAL RADICULOPATHY: Primary | ICD-10-CM

## 2021-02-25 DIAGNOSIS — M25.511 ACUTE PAIN OF RIGHT SHOULDER: ICD-10-CM

## 2021-02-25 DIAGNOSIS — M54.12 CERVICAL RADICULOPATHY: ICD-10-CM

## 2021-02-25 PROCEDURE — 99214 OFFICE O/P EST MOD 30 MIN: CPT | Performed by: FAMILY MEDICINE

## 2021-02-25 PROCEDURE — 73030 X-RAY EXAM OF SHOULDER: CPT

## 2021-02-25 RX ORDER — METHOCARBAMOL 500 MG/1
500 TABLET, FILM COATED ORAL
Qty: 30 TABLET | Refills: 0 | Status: SHIPPED | OUTPATIENT
Start: 2021-02-25 | End: 2021-03-11 | Stop reason: SDUPTHER

## 2021-02-25 RX ORDER — GABAPENTIN 300 MG/1
300 CAPSULE ORAL 3 TIMES DAILY
Qty: 90 CAPSULE | Refills: 1 | Status: SHIPPED | OUTPATIENT
Start: 2021-02-25 | End: 2022-03-04

## 2021-02-25 NOTE — PROGRESS NOTES
1  Cervical radiculopathy  gabapentin (NEURONTIN) 300 mg capsule    methocarbamol (ROBAXIN) 500 mg tablet    [facility]    XR shoulder 2+ vw right   2  Acute pain of right shoulder  XR shoulder 2+ vw right     Orders Placed This Encounter   Procedures    XR shoulder 2+ vw right    [facility]        Imaging Studies (I personally reviewed images in PACS and report):   x-ray right shoulder 02/25/2021:   No acute osseous abnormality  No significant arthritis  IMPRESSION:  Right Sided Cervical Radiculopathy without weakness      Repeat X-ray next visit:   None      Return in about 2 weeks (around 3/11/2021)  Patient Instructions   Discontinue Flexeril, continue Robaxin    finished prednisone  Continue diclofenac as needed   Discontinue gabapentin 100 mg  Start gabapentin 300 mg with titration schedule below  Goal 900 mg per day  May consider increase next visit if no improvement  Up to 20% of the population suffers from neck pain, and of the causes for neck pain, arthritis is the most common  The neck bones are known as vertebrae and number from C1 (cervical 1) to C7  The most common sites of "degenerative changes" (arthritis) are "betwween C4 & C7 " Nerves come out between each of these vertebrae and are labeled the left and right nerves  For example, the left C8 nerve is responsible for left sided finger flexion and sesnation of the pinky small finger  Some nerves higher up such as C3-C5 control other basic functions such as breathing  Causes of neck pain include neck strain which is an injury to muscles of the neck that results in neck muscles and trapezius tenderness, does not have any pinched nerve arm pain or numbness (known as radiculopathy), and lasts up to 6 weeks   Another name for neck strain is whiplash injury which often occurs in car accidents and can range from mild stiffness with some loss of movement to pinched nerves and even fractures of the vertebrae in severe cases  Arthritis of the neck is called Spondylosis and comes in different types  Facet joint arthritis describes loss of cushion between the small joints between the vertebrae and can cause chronic pain but not all people with spondylosis have persistent pain  The large joints between the vertebrae known as discs can also become arthritic as defined by loss of cushion space between these vertebrae known as DDD (degenerative disc disease)  May people have DDD with some sutdies showing up to 81% of people on average of 38yo however most do not have any pain until years later  Pinched nerves of the spine can occur in two ways  Radiculopathy is pinching of the nerves that branch off of the spinal cord at each level and usually cause neck pain and symptoms of pain or numbness associated with the upper extremity fed by that nerve  Spinal stenosis, in contrast, is a narrowing of the spinal canal and causing pinching of the spinal cord which can cause symptoms throughout the body including lower leg weakness, walking problems, and bladder and bowel dysfunction  Most neck strains do not require xrays however we generally gather more information with xrays in people who have history of traumatic event or have pain that does not resolve after 6 weeks of treatment  We reserve MRIs for red flags symptoms (fevers, risks of cancer such as unintentional weight loss, night sweats, prior history of cancer, muscle weakness) and for neck pain that does not resolve after 6 weeks of conservative management  (leonel lim 10/2018)    Treatment of neck pain includes special attention to ergonomics or sitting posture to avoid hunching and bad sleeping habits, physical therapy to strengthen muscles of the neck, and medications  Pain relievers such as tylenol are mainstays of treatment as well as anti-inflammatories such as naproxen (aleve), and ibuprofen (advil)   Muscle relaxer may be used as well and are reserved for use at night-time; however, they may be used during the day and if so, then the lowest possible does is used to avoid drowsiness and patients are instructed to refrain from driving or operating machinery due to risk of injury  Spinal manipulation as performed by a chiropractor or an osteopathic physician (DO) who performs manipulation as a part of practice may also be used if not improving with first-line treatments such as home exercise, medications, and physical therapy  For pain not responding to 6 weeks of conservative measures as outlined above, we generally order and MRI for further evaluation and refer to pain management for consideration of advance procedures such as neck injections (chaneltoanika Cai 10/2018)  risks of muscle relaxant medications (examples: flexeril, cyclobenzaprin, robaxin, methocarbamol, zanaflex, tizanidine)  include dizziness and drowsiness which may lead to falls or motor vehicle accidents and significant injury to self or others  Due to this, I recommend against taking muscle relaxants over 72years of age or if considered frail or have multiple chronic medical conditions, against driving or operating machinery, and against caring for children while taking muscle relaxants  There are also risks of mixing muscle relaxants with other medications such as benadryl, diphenhydramine, sleeping pills, opiate pain pills or other narcotic pain pills alcohol, benzodiazepines such as xanax (alprazolam), ativan (lorazepam), clonazepam, valium (diazepam) and other sedating medications including worsening drowsiness and risk of fall or motor vehicle accident, respiratory depression or trouble breathing, and death       Initiate gabapentin 300 mg each bedtime for 3 days, then add a m  dose for 3 days, then add afternoon dose for 3 days, and continue to titrate up in this fashion to a goal dose of 900 mg daily unless significant pain relief is achieved before that or side effect occurs  Patient should decrease to the previous tolerated dose in the event of side effect  Educated risks of mixing NSAIDS ( (non-steroidal anti-inflammatory pills including advil,diclofenac, ibuprofen, motrin, meloxicam, celecoxib, aleve, naproxen, and aspirin containing products) with each other or with steroids (such as prednisone, medrol)  Explained risks of mixing these medications including stomach ulcer, severe internal bleeding, and kidney failure  Instructed not to take NSAIDS if have history of stomach ulcers, kidney issues, or uncontrolled hypertension  Instructed patient to use only one brand as prescribed  For naproxen, a maximum of 500 mg per dose every 12 hours and no more than two doses or 1,000mg per day  For Ibuprofen, a maximum of 800 mg per dose every 6 hours but no more than 3 doses or 2,400 mg per day  Never take these medications together  Never take these medications the same day  For severe pain and only if you have no liver problems, you may add Tylenol (also known as acetaminophen) maximum of 1,000  Mg per dose every 6 hours but no more 3 doses or 3,000 mg per day  Patient expressed understanding and agreed to plan  CHIEF COMPLAINT:  New patient (self referral), neck/back pain    HPI:  Ld Christensen is a 53 y o  female with a history of vitamin D deficiency, scoliosis, lumbar radiculopathy, connective tissue disease who presents as a new patient for acute mildly traumatic neck/upper back pain after shoveling 3 weeks ago  She went to  urgent care on 02/17/2021 for this issue  Reported occasional numbness and tingling into the 4th and 5th digit of the right hand  She reported trying Flexeril, Diclofenac, and Gabapentin without much improvement  CRNP instructed patient to stop Flexeril and Diclofenac and to continue Gabapentin as directed  She was also to start prednisone  She was also prescribed RICE therapy         Visit 2/26/2021 :  She describes intermittent shoulder pain since December 2020  She tells me she had multiple injuries including falls  She did fall in her axilla  She denies any fall in her shoulder point  Recently within the past 3 weeks she was walking her dog her dog and down her arm causing worsening of her right shoulder pain  She also noticed worsening of pain with shoveling snow over recent snowstorm  Associated symptoms do include neck pain as well as radiation of pain down the arm into the hand  Associated symptoms also include intermittent numbness and tingling into the right hand  Patient was seen urgent care 02/17/2021  There she was diagnosed with right thoracic back pain  She was given Toradol injection in on site  She was started on prednisone taper as well as given gabapentin  She is also given Robaxin muscle aches  Today, She denies any significant improvement  She  Traces along the posterior aspect of her shoulder and down her lateral shoulder and arm as source of her pain  Review of Systems   Constitutional: Negative for chills, fever and unexpected weight change  HENT: Negative for hearing loss, nosebleeds and sore throat  Eyes: Negative for pain, redness and visual disturbance  Respiratory: Negative for cough, shortness of breath and wheezing  Cardiovascular: Negative for chest pain, palpitations and leg swelling  Gastrointestinal: Negative for abdominal distention, nausea and vomiting  Endocrine: Negative for polydipsia and polyuria  Genitourinary: Negative for dysuria and hematuria  Skin: Negative for rash and wound  Neurological: Negative for dizziness, numbness and headaches  Psychiatric/Behavioral: Negative for decreased concentration and suicidal ideas           Following history reviewed and update:    Past Medical History:   Diagnosis Date    Connective tissue disease ([facility] [address] )     Heart murmur     History of hepatitis C 5/1/2020    Lumbosacral radiculopathy 5/1/2020    MRSA (methicillin resistant Staphylococcus aureus)     X2    Scoliosis     Vitamin D deficiency 5/1/2020     Past Surgical History:   Procedure Laterality Date    OTHER SURGICAL HISTORY  2014    ovarian tumer removed, benign    OVARIAN CYST REMOVAL  2009     Social History   Social History     Substance and Sexual Activity   Alcohol Use Yes    Frequency: 2-4 times a month    Drinks per session: 1 or 2     Social History     Substance and Sexual Activity   Drug Use No     Social History     Tobacco Use   Smoking Status Never Smoker   Smokeless Tobacco Never Used     Family History   Problem Relation Age of Onset    No Known Problems Mother     No Known Problems Father     No Known Problems Sister     No Known Problems Brother     No Known Problems Maternal Aunt     No Known Problems Maternal Uncle     No Known Problems Paternal Aunt     No Known Problems Paternal Uncle     Ovarian cancer Maternal Grandmother     No Known Problems Maternal Grandfather     No Known Problems Paternal Grandmother     No Known Problems Paternal Grandfather     ADD / ADHD Neg Hx     Anesthesia problems Neg Hx     Cancer Neg Hx     Clotting disorder Neg Hx     Collagen disease Neg Hx     Diabetes Neg Hx     Dislocations Neg Hx     Learning disabilities Neg Hx     Neurological problems Neg Hx     Osteoporosis Neg Hx     Rheumatologic disease Neg Hx     Scoliosis Neg Hx     Vascular Disease Neg Hx      No Known Allergies       Physical Exam  /88 (BP Location: Left arm, Patient Position: Sitting, Cuff Size: Adult)   Pulse 96   Wt 81 1 kg (178 lb 12 8 oz)   BMI 27 59 kg/m²     Constitutional:  see vital signs  Gen: well-developed, normocephalic/atraumatic, well-groomed  Eyes: No inflammation or discharge of conjunctiva or lids; sclera clear   Pharynx: no inflammation, lesion, or mass of lips  Neck: supple, no masses, non-distended  MSK: no inflammation, lesion, mass, or clubbing of nails and digits except for other than mentioned below  SKIN: no visible rashes or skin lesions  Pulmonary/Chest: Effort normal  No respiratory distress     NEURO: cranial nerves grossly intact  PSYCH:  Alert and oriented to person, place, and time; recent and remote memory intact; mood normal, no depression, anxiety, or agitation, judgment and insight good and intact     Ortho Exam    Cervical  ROM: intact  Midline spinous process tenderness: None  Muscular Tenderness: +  Right paraspinals and upper trapezius  Sensation UE Bilateral:  C5: normal  C6: normal  C7: normal  C8: normal  T1: normal  Strength UE: 5/5 elbow, wrist, fingers bilateral         RIGHT SHOULDER:  Erythema: no  Swelling: no  Increased Warmth: no    Tenderness: + right upper trapezius    ROM  Touchdown sign: intact   external rotation:  Intact   Internal rotation: Intact    Strength  Abduction: 5/5  ER: 5/5  IR: 5/5    Drop-Arm: negative  Emptycan: negative  Belly Press: negative  Lift-off Test:    Garrett: negative  Neer: negative      LEFT SHOULDER:  Strength  Abduction: 5/5  ER: 5/5  IR: 5/5    ROM  Touchdown sign: intact  Appley Scratch Test: symmetric  Passive: symmetric    Empty can: negative     Procedures

## 2021-02-25 NOTE — PATIENT INSTRUCTIONS
Discontinue Flexeril, continue Robaxin    finished prednisone  Continue diclofenac as needed   Discontinue gabapentin 100 mg  Start gabapentin 300 mg with titration schedule below  Goal 900 mg per day  May consider increase next visit if no improvement  Up to 20% of the population suffers from neck pain, and of the causes for neck pain, arthritis is the most common  The neck bones are known as vertebrae and number from C1 (cervical 1) to C7  The most common sites of "degenerative changes" (arthritis) are "betwween C4 & C7 " Nerves come out between each of these vertebrae and are labeled the left and right nerves  For example, the left C8 nerve is responsible for left sided finger flexion and sesnation of the pinky small finger  Some nerves higher up such as C3-C5 control other basic functions such as breathing  Causes of neck pain include neck strain which is an injury to muscles of the neck that results in neck muscles and trapezius tenderness, does not have any pinched nerve arm pain or numbness (known as radiculopathy), and lasts up to 6 weeks  Another name for neck strain is whiplash injury which often occurs in car accidents and can range from mild stiffness with some loss of movement to pinched nerves and even fractures of the vertebrae in severe cases  Arthritis of the neck is called Spondylosis and comes in different types  Facet joint arthritis describes loss of cushion between the small joints between the vertebrae and can cause chronic pain but not all people with spondylosis have persistent pain  The large joints between the vertebrae known as discs can also become arthritic as defined by loss of cushion space between these vertebrae known as DDD (degenerative disc disease)  May people have DDD with some sutdies showing up to 81% of people on average of 38yo however most do not have any pain until years later  Pinched nerves of the spine can occur in two ways   Radiculopathy is pinching of the nerves that branch off of the spinal cord at each level and usually cause neck pain and symptoms of pain or numbness associated with the upper extremity fed by that nerve  Spinal stenosis, in contrast, is a narrowing of the spinal canal and causing pinching of the spinal cord which can cause symptoms throughout the body including lower leg weakness, walking problems, and bladder and bowel dysfunction  Most neck strains do not require xrays however we generally gather more information with xrays in people who have history of traumatic event or have pain that does not resolve after 6 weeks of treatment  We reserve MRIs for red flags symptoms (fevers, risks of cancer such as unintentional weight loss, night sweats, prior history of cancer, muscle weakness) and for neck pain that does not resolve after 6 weeks of conservative management  (chaneltodafranco lim 10/2018)    Treatment of neck pain includes special attention to ergonomics or sitting posture to avoid hunching and bad sleeping habits, physical therapy to strengthen muscles of the neck, and medications  Pain relievers such as tylenol are mainstays of treatment as well as anti-inflammatories such as naproxen (aleve), and ibuprofen (advil)  Muscle relaxer may be used as well and are reserved for use at night-time; however, they may be used during the day and if so, then the lowest possible does is used to avoid drowsiness and patients are instructed to refrain from driving or operating machinery due to risk of injury  Spinal manipulation as performed by a chiropractor or an osteopathic physician (DO) who performs manipulation as a part of practice may also be used if not improving with first-line treatments such as home exercise, medications, and physical therapy       For pain not responding to 6 weeks of conservative measures as outlined above, we generally order and MRI for further evaluation and refer to pain management for consideration of advance procedures such as neck injections (uptodate Angel Manzo 10/2018)  risks of muscle relaxant medications (examples: flexeril, cyclobenzaprin, robaxin, methocarbamol, zanaflex, tizanidine)  include dizziness and drowsiness which may lead to falls or motor vehicle accidents and significant injury to self or others  Due to this, I recommend against taking muscle relaxants over 72years of age or if considered frail or have multiple chronic medical conditions, against driving or operating machinery, and against caring for children while taking muscle relaxants  There are also risks of mixing muscle relaxants with other medications such as benadryl, diphenhydramine, sleeping pills, opiate pain pills or other narcotic pain pills alcohol, benzodiazepines such as xanax (alprazolam), ativan (lorazepam), clonazepam, valium (diazepam) and other sedating medications including worsening drowsiness and risk of fall or motor vehicle accident, respiratory depression or trouble breathing, and death  Initiate gabapentin 300 mg each bedtime for 3 days, then add a m  dose for 3 days, then add afternoon dose for 3 days, and continue to titrate up in this fashion to a goal dose of 900 mg daily unless significant pain relief is achieved before that or side effect occurs  Patient should decrease to the previous tolerated dose in the event of side effect  Educated risks of mixing NSAIDS ( (non-steroidal anti-inflammatory pills including advil,diclofenac, ibuprofen, motrin, meloxicam, celecoxib, aleve, naproxen, and aspirin containing products) with each other or with steroids (such as prednisone, medrol)  Explained risks of mixing these medications including stomach ulcer, severe internal bleeding, and kidney failure  Instructed not to take NSAIDS if have history of stomach ulcers, kidney issues, or uncontrolled hypertension  Instructed patient to use only one brand as prescribed   For naproxen, a maximum of 500 mg per dose every 12 hours and no more than two doses or 1,000mg per day  For Ibuprofen, a maximum of 800 mg per dose every 6 hours but no more than 3 doses or 2,400 mg per day  Never take these medications together  Never take these medications the same day  For severe pain and only if you have no liver problems, you may add Tylenol (also known as acetaminophen) maximum of 1,000  Mg per dose every 6 hours but no more 3 doses or 3,000 mg per day  Patient expressed understanding and agreed to plan

## 2021-03-02 ENCOUNTER — EVALUATION (OUTPATIENT)
Dept: PHYSICAL THERAPY | Facility: CLINIC | Age: 51
End: 2021-03-02
Payer: COMMERCIAL

## 2021-03-02 DIAGNOSIS — M54.12 CERVICAL RADICULOPATHY: ICD-10-CM

## 2021-03-02 PROCEDURE — 97161 PT EVAL LOW COMPLEX 20 MIN: CPT | Performed by: PHYSICAL THERAPIST

## 2021-03-02 NOTE — PROGRESS NOTES
PT Evaluation     Today's date: 3/2/2021  Patient name: Herbert Giraldo  : 1970  MRN: [id number]  Referring provider: Romelia Koch  Dx: No diagnosis found  Start Time:   Stop Time:   Total time in clinic (min): 45 minutes    Assessment  Assessment details: Herbert Giraldo is a 53 y o  female who presents with signs and symptoms consistent of acute cervical radiculopathy  Patient presents with cervical pain that radiates into the arm, decreased DNF and cervical neck strength, decreased ROM with flexion and R side bending and decreased joint mobility in the lower cervical spine  Patient demonstrated neural tension and relief with glides indicating involvement of the distal nerve as well as positive testing with spurlings compression and relief with arm abduction indicating involvement at the nerve root  Patient also demonstrated hypomobility of the lower cervical spine with  and lateral mobilization  Due to these impairments, Patient has difficulty performing a/iadls, recreational activities and work-related activities  Patient would benefit from skilled physical therapy to address the impairments, improve their level of function, and to improve their overall quality of life  Impairments: abnormal or restricted ROM, activity intolerance, impaired physical strength, lacks appropriate home exercise program, pain with function, poor posture  and poor body mechanics    Symptom irritability: moderateUnderstanding of Dx/Px/POC: good   Prognosis: fair    Goals  Short Term Goals: to be achieved by 4 weeks  1) Patient to be independent with basic HEP  2) Decrease pain to 4 and 5/10 at its worst   3) Increase cervical spine ROM by 5-10 degrees in all deficient planes  4) Increase UE strength by 1/2 MMT grade in all deficient planes    5) Improve joint mobility in cervical spine to normal     Long Term Goals: to be achieved by discharge  1) FOTO equal to or greater than target score indicating improvements with overall function  2) Patient to be independent with comprehensive HEP  3) Cervical spine ROM WNL all planes to improve a/iadls  4) Patient will have little to no radiating arm pain in order to improve quality of life and in order to participate in daily activities  5) Lifting is improved to maximal level of function       Plan  Plan details: 1-2x a week for 4-6 weeks   Patient would benefit from: PT eval and skilled physical therapy  Planned modality interventions: low level laser therapy  Planned therapy interventions: manual therapy, neuromuscular re-education, patient education, therapeutic activities, therapeutic exercise and home exercise program  Treatment plan discussed with: patient        Subjective Evaluation    History of Present Illness  Mechanism of injury: History of Current Injury: Patient reports that about 1 year diagnosed with scleroderma  After that patient was staying active with walking and running  Patient notes that in early December she had a fall in the shower, then tripped going down steps, and then on ice within a couple weeks of each other  Patient notes that she had the buring shoulder pain and some neck pain but notes that it was getting better within the next couple weeks  Then a few weeks ago with the big snow storm she did a lot of shoveling and since then the pain has been excruciating  Patient notes that she went to the urgent care and she was given predinsone taper which helped but now it just continues to be very painful  Patient went to orthopedic and said to try therapy and will re-evaluate in a few weeks if injection or MRI is necessary  Pain location/Descriptors: Patient reports lower cervical spine and radiating pain down R arm and into the hand (pinky and ring finger)  Patient notes that she had numbness and tingling down the arm but now its more of sharp/hard pain  Did have burning pain in the posterior shoulder pain but has improved   Patient notes that the neck pain feels really tight in the one verterbae  Aggravating factors: looking down; shaking head yes; turning the head to the left  Easing factors: medication and rest sometimes  24 HR pattern: no changes   Imaging: x-ray   Special Questions: sleep disturbance; Denies Ds and Vs  No history of cancer  No weight changes  Patient goals: Patient reports her goals for physical therapy would be to decrease pain and learn to self manage at home  Hobbies/Interest: walking; staying active   Occupation: new company; desk work; working from home     Pain  Current pain ratin  At best pain ratin  At worst pain rating: 10  Location: Cervical spine into the arm  Quality: sharp and tight  Relieving factors: medications, heat and change in position  Aggravating factors: lifting and overhead activity          Objective     Concurrent Complaints  Positive for disturbed sleep, dizziness and headaches  Negative for night pain, faints, nausea/motion sickness, tinnitus, trouble swallowing, shortness of breath and visual change    Palpation     Right   Hypertonic in the cervical paraspinals, levator scapulae, middle trapezius and upper trapezius  Tenderness of the cervical paraspinals, levator scapulae, middle trapezius and upper trapezius       Neurological Testing     Sensation   Cervical/Thoracic   Left   Intact: light touch    Right   Intact: light touch    Reflexes   Left   Biceps (C5/C6): normal (2+)  Brachioradialis (C6): normal (2+)  Galeano's reflex: negative    Right   Biceps (C5/C6): normal (2+)  Brachioradialis (C6): normal (2+)  Galeano's reflex: negative    Active Range of Motion   Cervical/Thoracic Spine       Cervical    Flexion: 30 degrees  with pain  Extension: 40 degrees      Left lateral flexion: 40 degrees     with pain  Right lateral flexion: 48 degrees      Left rotation: 45 degrees  Right rotation: 45 degrees    with pain    Joint Play     Hypomobile: C3, C4, C5, C6 and C7     Pain: C5, C6 and C7   Mechanical Assessment    Cervical    Seated Extension: repeated movements  Pain location: centralized  Pain intensity: better  Pain level: decreased  Seated right sidebend: repeated movements  Pain location: no change  Pain level: produced    Thoracic      Lumbar      Strength/Myotome Testing   Cervical Spine     Left   Normal strength    Right   Normal strength    Tests   Cervical   Positive repeated extension, vertical compression and lumbar distraction test     Right   Positive Spurling's Test A  Right Shoulder   Positive ULTT2  Lumbar   Positive vertical compression   Additional Tests Details  Arm abduction test: positive with symptom relief   Neuro Exam:     Headaches   Patient reports headaches: Yes         Flowsheet Rows      Most Recent Value   PT/OT G-Codes   Current Score  44   Projected Score  65             Precautions: Connective Tissue Disease      Manuals             Manual stretching              IASTM              LLL              Cervical Mobs             Neuro Re-Ed             Chin tucks              Deep neck flexor curls              Laser maze              Laser target practice              UE nerve glide              Cervical isometrics into pillow                           Ther Ex             Rotation SNAG             Extension SNAG             Side bending stretch              Resisted rows             Resisted extensions              Thoracic extensions on bolster                                        Ther Activity                                       Gait Training                                       Modalities

## 2021-03-04 ENCOUNTER — OFFICE VISIT (OUTPATIENT)
Dept: PHYSICAL THERAPY | Facility: CLINIC | Age: 51
End: 2021-03-04
Payer: COMMERCIAL

## 2021-03-04 DIAGNOSIS — M54.12 CERVICAL RADICULOPATHY: Primary | ICD-10-CM

## 2021-03-04 PROCEDURE — 97112 NEUROMUSCULAR REEDUCATION: CPT | Performed by: PHYSICAL THERAPIST

## 2021-03-04 PROCEDURE — 97110 THERAPEUTIC EXERCISES: CPT | Performed by: PHYSICAL THERAPIST

## 2021-03-04 NOTE — PROGRESS NOTES
Daily Note     Today's date: 3/4/2021  Patient name: Zachary Matson  : 1970  MRN: [id number]  Referring provider: Zoë Hernandze  Dx:   Encounter Diagnosis     ICD-10-CM    1  Cervical radiculopathy  M54 12        Start Time:   Stop Time:   Total time in clinic (min): 45 minutes    Subjective: Patient reports, "I am doing better today compared to the last time I was in  After I left last time the one tightness spot felt a lot better  The arm pain is better but its still there  I was able to do the exercises and they felt pretty good  I am also sleeping better the past few days "       Objective: See treatment diary below      Assessment: Patient tolerated treatment well  Patient responded well to the introduction of exercise program this date  Patient had mild to moderate pain in the neck and arm throughout session  Patient demonstrated hypomobility in the CT junction and throughout the lower cervical spine  Patient responded well to cervical manual traction and nerve glides  Patient noted decreased pain and discomfort in the arm and neck post manual  Patient would benefit from continued PT      Plan: Continue per plan of care        Precautions: Connective Tissue Disease      Manuals 3/4            Manual stretching              Suboccipital  ACL            Ulnar nerve glides  ACL             Cervical Mobs CT Junction  ACL       Lat glides    ACL            Neuro Re-Ed             Chin tucks  5" 10x             Deep neck flexor curls              Laser maze              Laser target practice              UE nerve glide  Ulnar nerve    2x10             Cervical isometrics into pillow  Extension  10" 10x                          Ther Ex             Rotation SNAG 10" hold 10x ea             Extension SNAG 10" hold 10x             Side bending stretch              Resisted rows             Resisted extensions              Thoracic extensions on bolster                                        Ther Activity                                       Gait Training                                       Modalities

## 2021-03-09 ENCOUNTER — OFFICE VISIT (OUTPATIENT)
Dept: PHYSICAL THERAPY | Facility: CLINIC | Age: 51
End: 2021-03-09
Payer: COMMERCIAL

## 2021-03-09 DIAGNOSIS — M54.12 CERVICAL RADICULOPATHY: Primary | ICD-10-CM

## 2021-03-09 PROCEDURE — 97110 THERAPEUTIC EXERCISES: CPT | Performed by: PHYSICAL THERAPIST

## 2021-03-09 PROCEDURE — 97112 NEUROMUSCULAR REEDUCATION: CPT | Performed by: PHYSICAL THERAPIST

## 2021-03-09 PROCEDURE — 97140 MANUAL THERAPY 1/> REGIONS: CPT | Performed by: PHYSICAL THERAPIST

## 2021-03-09 NOTE — PROGRESS NOTES
Daily Note     Today's date: 3/9/2021  Patient name: Janette Dowell  : 1970  MRN: [id number]  Referring provider: [facility]  Dx:   Encounter Diagnosis     ICD-10-CM    1  Cervical radiculopathy  M54 12        Start Time: 55  Stop Time:   Total time in clinic (min): 45 minutes    Subjective: Patient reports, "I had a weird sensation after I left here the last time  When I got into my car my whole body started shaking for like a few minutes and then it went away  I was very sore after last time for like two days where I couldn't do to much  I was afraid to do any of the exercises cause I didn't want to make it worse  The arm pain has been doing better though "       Objective: See treatment diary below      Assessment: Patient tolerated treatment fair  Focused on my manual this treatment in order to ease pain and discomfort  Patient demonstrated hypomobility in the CT junction and throughout the lower cervical spine  Patient had slight increase in shoulder pain and arm pain with mobilizations but felt like it helped the neck  Patient present as a cervical radiculopathy but may benefit from MRI imaging to rule out any other insidious symptoms  Patient responded well to cervical manual traction and nerve glides  Patient noted decreased pain and discomfort in the arm and neck post manual  Patient would benefit from continued PT      Plan: Continue per plan of care        Precautions: Connective Tissue Disease      Manuals 3/4 3/9           Manual stretching              Suboccipital  ACL ACL           Ulnar nerve glides  ACL  ACL            Cervical Mobs CT Junction  ACL       Lat glides    ACL CT Junction  ACL       Lat glides    ACL    upper Tspine mobs            Neuro Re-Ed             Chin tucks  5" 10x  5" 10x            Deep neck flexor curls              Laser maze              Laser target practice              UE nerve glide  Ulnar nerve    2x10             Cervical isometrics into pillow  Extension  10" 10x                          Ther Ex             Rotation SNAG 10" hold 10x ea             Extension SNAG 10" hold 10x  10" hold 5x            Side bending stretch              Resisted rows             Resisted extensions              Thoracic extensions on bolster                                        Ther Activity                                       Gait Training                                       Modalities

## 2021-03-11 ENCOUNTER — OFFICE VISIT (OUTPATIENT)
Dept: OBGYN CLINIC | Facility: OTHER | Age: 51
End: 2021-03-11
Payer: COMMERCIAL

## 2021-03-11 VITALS
DIASTOLIC BLOOD PRESSURE: 84 MMHG | HEART RATE: 99 BPM | SYSTOLIC BLOOD PRESSURE: 132 MMHG | BODY MASS INDEX: 28.08 KG/M2 | WEIGHT: 182 LBS

## 2021-03-11 DIAGNOSIS — M54.12 CERVICAL RADICULOPATHY: Primary | ICD-10-CM

## 2021-03-11 PROCEDURE — 99213 OFFICE O/P EST LOW 20 MIN: CPT | Performed by: FAMILY MEDICINE

## 2021-03-11 PROCEDURE — 1036F TOBACCO NON-USER: CPT | Performed by: FAMILY MEDICINE

## 2021-03-11 RX ORDER — METHOCARBAMOL 500 MG/1
500 TABLET, FILM COATED ORAL
Qty: 30 TABLET | Refills: 1 | Status: SHIPPED | OUTPATIENT
Start: 2021-03-11

## 2021-03-11 NOTE — PROGRESS NOTES
1  Cervical radiculopathy  methocarbamol (ROBAXIN) 500 mg tablet     No orders of the defined types were placed in this encounter  Imaging Studies (I personally reviewed images in PACS and report):    IMPRESSION:   cervical radiculopathy -mildly improved but still with intermittent bouts of severe pain without weakness      Repeat X-ray next visit:    non      Return in about 4 weeks (around 4/8/2021)  Patient Instructions   Continue physical therapy  Continue gabapentin  We will discuss weening as needed at next visit  Refill given for methocarbamol muscle relaxer  May take during the day as needed every 8hours  sparingly but to avoid driving when taking  Continue diclofenac as needed            CHIEF COMPLAINT:   follow-up right-sided neck pain and radiculopathy    HPI:  Josseline Garrett is a 53 y o  female  who presents for       Visit 3/12/2021 :   follow-up right-sided neck pain and radiculopathy     Last visit patient discontinued Flexeril and did continue Robaxin  She may to continue diclofenac as needed  Start on higher dose of gabapentin 300 mg with goal of 900 mg per day  She was also given physical therapy referral       Today, patient tells me she has had mild improvement since her last visit  There are days when she does have severe pain but overall she feels that physical therapy is helping her  She requests refill of methocarbamol  She does take methocarbamol throughout the day once or twice which offer some relief  She has titrated up to 900 mg total per day a gabapentin and her arm pain is improved  Most her pain is in the upper trapezius region at this time  Review of Systems   Constitutional: Negative for chills and fever  Neurological: Negative for weakness           Following history reviewed and update:    Past Medical History:   Diagnosis Date    Connective tissue disease ([address] )     Heart murmur     History of hepatitis C 5/1/2020    Lumbosacral radiculopathy 5/1/2020    MRSA (methicillin resistant Staphylococcus aureus)     X2    Scoliosis     Vitamin D deficiency 5/1/2020     Past Surgical History:   Procedure Laterality Date    OTHER SURGICAL HISTORY  2014    ovarian tumer removed, benign    OVARIAN CYST REMOVAL  2009     Social History   Social History     Substance and Sexual Activity   Alcohol Use Yes    Frequency: 2-4 times a month    Drinks per session: 1 or 2     Social History     Substance and Sexual Activity   Drug Use No     Social History     Tobacco Use   Smoking Status Never Smoker   Smokeless Tobacco Never Used     Family History   Problem Relation Age of Onset    No Known Problems Mother     No Known Problems Father     No Known Problems Sister     No Known Problems Brother     No Known Problems Maternal Aunt     No Known Problems Maternal Uncle     No Known Problems Paternal Aunt     No Known Problems Paternal Uncle     Ovarian cancer Maternal Grandmother     No Known Problems Maternal Grandfather     No Known Problems Paternal Grandmother     No Known Problems Paternal Grandfather     ADD / ADHD Neg Hx     Anesthesia problems Neg Hx     Cancer Neg Hx     Clotting disorder Neg Hx     Collagen disease Neg Hx     Diabetes Neg Hx     Dislocations Neg Hx     Learning disabilities Neg Hx     Neurological problems Neg Hx     Osteoporosis Neg Hx     Rheumatologic disease Neg Hx     Scoliosis Neg Hx     Vascular Disease Neg Hx      No Known Allergies       Physical Exam  /84 (BP Location: Left arm, Patient Position: Sitting, Cuff Size: Adult)   Pulse 99   Wt 82 6 kg (182 lb)   BMI 28 08 kg/m²     Constitutional:  see vital signs  Gen: well-developed, normocephalic/atraumatic, well-groomed  Eyes: No inflammation or discharge of conjunctiva or lids; sclera clear   Pharynx: no inflammation, lesion, or mass of lips  Neck: supple, no masses, non-distended  MSK: no inflammation, lesion, mass, or clubbing of nails and digits except for other than mentioned below  SKIN: no visible rashes or skin lesions  Pulmonary/Chest: Effort normal  No respiratory distress  NEURO: cranial nerves grossly intact  PSYCH:  Alert and oriented to person, place, and time; recent and remote memory intact; mood normal, no depression, anxiety, or agitation, judgment and insight good and intact     Ortho Exam    Cervical  Midline spinous process tenderness: None  Muscular Tenderness: +  Right paraspinals  Sensation UE Bilateral:  C5: normal  C6: normal  C7: normal  C8: normal  T1: normal  Strength UE: 5/5 elbow, wrist, fingers bilateral        Procedures     Total visit time was 15 minutes of which more than 50% was face to face counseling and/or coordination of care with patient regarding their treatment plan as outlined in note

## 2021-03-11 NOTE — PATIENT INSTRUCTIONS
Continue physical therapy  Continue gabapentin  We will discuss weening as needed at next visit  Refill given for methocarbamol muscle relaxer  May take during the day as needed every 8hours  sparingly but to avoid driving when taking     Continue diclofenac as needed

## 2021-03-19 ENCOUNTER — OFFICE VISIT (OUTPATIENT)
Dept: PHYSICAL THERAPY | Facility: CLINIC | Age: 51
End: 2021-03-19
Payer: COMMERCIAL

## 2021-03-19 DIAGNOSIS — M54.12 CERVICAL RADICULOPATHY: Primary | ICD-10-CM

## 2021-03-19 PROCEDURE — 97110 THERAPEUTIC EXERCISES: CPT | Performed by: PHYSICAL THERAPIST

## 2021-03-19 PROCEDURE — 97112 NEUROMUSCULAR REEDUCATION: CPT | Performed by: PHYSICAL THERAPIST

## 2021-03-19 PROCEDURE — 97140 MANUAL THERAPY 1/> REGIONS: CPT | Performed by: PHYSICAL THERAPIST

## 2021-03-19 NOTE — PROGRESS NOTES
Daily Note     Today's date: 3/19/2021  Patient name: Lesa Cassidy  : 1970  MRN: [id number]  Referring provider: Brian Myles  Dx:   Encounter Diagnosis     ICD-10-CM    1  Cervical radiculopathy  M54 12        Start Time: 900  Stop Time: 945  Total time in clinic (min): 45 minutes    Subjective: Patient reports, "I felt pretty good after last session and didn't have any weird sensations like last time  Doctor said to continue with therapy for the next 4 weeks and see how I respond  I no longer have the numbness or pain in the hand just a little in the forearm and primarily in the shoulder and neck  I do feel a little bit better  I did have a weird thing happen yesterday where my R hand got really cold and looked a little blue but apparently that can happen with my autoimmune but it just never happened before so I wasn't sure "    Objective: See treatment diary below      Assessment: Patient tolerated treatment well  Patient responded well to cervical manual traction and nerve glides  Patient able to tolerate more exercises with date without decreased complaints of arm/shoulder pain  Will continue to progress as able and monitor symptoms  Patient noted decreased pain and discomfort in the arm and neck post manual  Patient would benefit from continued PT      Plan: Continue per plan of care        Precautions: Connective Tissue Disease      Manuals 3/4 3/9 3/18          Manual stretching              Suboccipital  ACL ACL ACL          Ulnar nerve glides  ACL  ACL  ACL          Cervical Mobs CT Junction  ACL       Lat glides    ACL CT Junction  ACL       Lat glides    ACL    upper Tspine mobs  CT Junction  ACL       Lat glides    ACL              Neuro Re-Ed             Chin tucks  5" 10x  5" 10x  5" 10x           Deep neck flexor curls              Laser maze              Laser target practice              UE nerve glide  Ulnar nerve    2x10             Cervical isometrics into pillow Extension  10" 10x                          Ther Ex             Rotation SNAG 10" hold 10x ea             Extension SNAG 10" hold 10x  10" hold 5x  10" hold 5x           Side bending stretch              Resisted rows             Resisted extensions              Thoracic extensions on bolster              UT trap stretch    15" 10x           Levator stretch    15" 10x           Ther Activity                                       Gait Training                                       Modalities

## 2021-03-22 ENCOUNTER — OFFICE VISIT (OUTPATIENT)
Dept: PHYSICAL THERAPY | Facility: CLINIC | Age: 51
End: 2021-03-22
Payer: COMMERCIAL

## 2021-03-22 DIAGNOSIS — M54.12 CERVICAL RADICULOPATHY: Primary | ICD-10-CM

## 2021-03-22 PROCEDURE — 97112 NEUROMUSCULAR REEDUCATION: CPT | Performed by: PHYSICAL THERAPIST

## 2021-03-22 PROCEDURE — 97110 THERAPEUTIC EXERCISES: CPT | Performed by: PHYSICAL THERAPIST

## 2021-03-22 PROCEDURE — 97140 MANUAL THERAPY 1/> REGIONS: CPT | Performed by: PHYSICAL THERAPIST

## 2021-03-22 NOTE — PROGRESS NOTES
Daily Note     Today's date: 3/22/2021  Patient name: Aaliyah Vazquez  : 1970  MRN: [id number]  Referring provider: Jann Oakley  Dx:   Encounter Diagnosis     ICD-10-CM    1  Cervical radiculopathy  M54 12        Start Time:   Stop Time:   Total time in clinic (min): 50 minutes    Subjective: Patient reports, "After last session I wasn't too sore until at night time I had a decent amount of pain  Over the weekend I got CBD oil drops and tried those cause they have actually help in the past and I feel like it helped a lot cause I didn't have like any pain part of Saturday and all of   It is pretty sore today since I had a full day of work  I don't have much in the hand symptoms just mainly in the upper arm and neck  "    Objective: See treatment diary below      Assessment: Patient tolerated treatment well  Patient responded well to cervical manual traction and STM of the UT  Patient able to tolerate more exercises with date without decreased complaints of arm/shoulder pain  Introduced thoracic mobility and strengthening this date for a more comprehensive approach  Trialed repeated chin tucks in sitting to see if her symptoms centralize  She had partial centralization for the first 15x and then when tried to perform toward the end of the session patient had peripheralizing symptoms  It is hard to pinpoint a directional preference at this time  Will continue to progress as able and monitor symptoms  Patient noted decreased pain and discomfort in the arm and neck post manual  Patient would benefit from continued PT      Plan: Continue per plan of care        Precautions: Connective Tissue Disease      Manuals 3/4 3/9 3/18 3/22         Manual stretching              Suboccipital  ACL ACL ACL ACL         Ulnar nerve glides  ACL  ACL  ACL ACL         Cervical Mobs CT Junction  ACL       Lat glides    ACL CT Junction  ACL       Lat glides    ACL    upper Tspine mobs  CT Junction  ACL Lat glides    ACL     CT Junction  ACL       Lat glides    ACL           Neuro Re-Ed             Chin tucks  5" 10x  5" 10x  5" 10x  Chin tuck 15x in a row  2x          Deep neck flexor curls              Laser maze              Laser target practice              UE nerve glide  Ulnar nerve    2x10             Cervical isometrics into pillow  Extension  10" 10x    Extension  10" 10x          Isometrics rotation and side bending    With hand 10x 5" hold          Ther Ex             Rotation SNAG 10" hold 10x ea             Extension SNAG 10" hold 10x  10" hold 5x  10" hold 5x  10" hold 5x         Side bending stretch              Resisted rows    2x10   RTB         Resisted extensions     10x RTB          Thoracic extensions on bolster     10x 5" hold          UT trap stretch    15" 10x           Levator stretch    15" 10x           Ther Activity                                       Gait Training                                       Modalities

## 2021-03-26 ENCOUNTER — APPOINTMENT (OUTPATIENT)
Dept: PHYSICAL THERAPY | Facility: CLINIC | Age: 51
End: 2021-03-26
Payer: COMMERCIAL

## 2021-03-30 ENCOUNTER — OFFICE VISIT (OUTPATIENT)
Dept: PHYSICAL THERAPY | Facility: CLINIC | Age: 51
End: 2021-03-30
Payer: COMMERCIAL

## 2021-03-30 DIAGNOSIS — M54.12 CERVICAL RADICULOPATHY: Primary | ICD-10-CM

## 2021-03-30 PROCEDURE — 97110 THERAPEUTIC EXERCISES: CPT | Performed by: PHYSICAL THERAPIST

## 2021-03-30 PROCEDURE — 97112 NEUROMUSCULAR REEDUCATION: CPT | Performed by: PHYSICAL THERAPIST

## 2021-03-30 PROCEDURE — 97140 MANUAL THERAPY 1/> REGIONS: CPT | Performed by: PHYSICAL THERAPIST

## 2021-03-30 NOTE — PROGRESS NOTES
Daily Note     Today's date: 3/30/2021  Patient name: Randy Sadler  : 1970  MRN: [id number]  Referring provider: Aruna Brandt  Dx:   Encounter Diagnosis     ICD-10-CM    1  Cervical radiculopathy  M54 12        Start Time: 55  Stop Time:   Total time in clinic (min): 45 minutes    Subjective: Patient reports, "After last session I was very sore right on the back of my neck for like 2 days but the pain was so bad it was hard to function  I have been using the CBD oil drops and the cream and it has been feeling better  Overall it is better but still having a lot of neck pain with more movement  The arm numbness comes and goes but it definitely improved  At this point I am having more pain in the neck than the arm or shoulder "    Objective: See treatment diary below      Assessment: Patient tolerated treatment well  Educated the patient about the how it is positive that the symptoms are centralizing  That means the nerve irritation is healing but unfortunately it is common to have increased pain at the more proximal point which would be the neck pain  Decreased intensity of neck exercises this date secondary to too much pain after last session  Focused on manual treatment in order to increased mobility in the cervical spine  Will continue to progress as able and monitor symptoms  Patient noted decreased pain and discomfort in the arm and neck post manual  Patient would benefit from continued PT      Plan: Continue per plan of care        Precautions: Connective Tissue Disease      Manuals 3/4 3/9 3/18 3/22 3/30        Manual stretching              Suboccipital  ACL ACL ACL ACL ACL        Ulnar nerve glides  ACL  ACL  ACL ACL         Cervical Mobs [facility] [place]  ACL       Lat glides    ACL [facility] [place]  ACL       Lat glides    ACL    Upper Tspine mobs  [facility] [place]  ACL       Lat glides    ACL     [facility] [place]  ACL       Lat glides    ACL   [facility] [place]  ACL       Lat glides    ACL        Neuro Re-Ed             Chin tucks  5" 10x  5" 10x  5" 10x  Chin tuck 15x in a row  2x          Deep neck flexor curls              Laser maze              Laser target practice              UE nerve glide  Ulnar nerve    2x10    Ulnar nerve    2x10          Cervical isometrics into pillow  Extension  10" 10x    Extension  10" 10x  Extension  10" 10x         Isometrics rotation and side bending    With hand 10x 5" hold          Ther Ex             Rotation SNAG 10" hold 10x ea             Extension SNAG 10" hold 10x  10" hold 5x  10" hold 5x  10" hold 5x         Side bending stretch              Resisted rows    2x10   RTB 2x10 BTB        Resisted extensions     10x RTB  2x10 RTB        Thoracic extensions on bolster     10x 5" hold          UT trap stretch    15" 10x   5" 5x         Levator stretch    15" 10x   10" 10x         Ther Activity                                       Gait Training                                       Modalities

## 2021-04-26 NOTE — PROGRESS NOTES
PT DISCHARGE    Patient canceled last remaining appointment and noted she would call to reschedule after follow-up with doctor  Return call has not been received  Patient appropriate for DC this date

## 2021-07-22 DIAGNOSIS — M54.17 LUMBOSACRAL RADICULOPATHY: ICD-10-CM

## 2021-07-22 RX ORDER — DICLOFENAC SODIUM 75 MG/1
TABLET, DELAYED RELEASE ORAL
Qty: 60 TABLET | Refills: 3 | Status: SHIPPED | OUTPATIENT
Start: 2021-07-22

## 2022-03-04 ENCOUNTER — OFFICE VISIT (OUTPATIENT)
Dept: FAMILY MEDICINE CLINIC | Facility: CLINIC | Age: 52
End: 2022-03-04
Payer: COMMERCIAL

## 2022-03-04 VITALS
HEIGHT: 67 IN | HEART RATE: 84 BPM | OXYGEN SATURATION: 98 % | DIASTOLIC BLOOD PRESSURE: 84 MMHG | TEMPERATURE: 97.4 F | BODY MASS INDEX: 29.35 KG/M2 | WEIGHT: 187 LBS | SYSTOLIC BLOOD PRESSURE: 116 MMHG

## 2022-03-04 DIAGNOSIS — R10.9 LEFT FLANK PAIN: ICD-10-CM

## 2022-03-04 DIAGNOSIS — Z87.442 HISTORY OF KIDNEY STONES: ICD-10-CM

## 2022-03-04 DIAGNOSIS — M41.9 SCOLIOSIS, UNSPECIFIED SCOLIOSIS TYPE, UNSPECIFIED SPINAL REGION: ICD-10-CM

## 2022-03-04 DIAGNOSIS — M25.552 LEFT HIP PAIN: ICD-10-CM

## 2022-03-04 DIAGNOSIS — Z87.440 HISTORY OF KIDNEY INFECTION: ICD-10-CM

## 2022-03-04 DIAGNOSIS — M54.17 LUMBOSACRAL RADICULOPATHY: Primary | ICD-10-CM

## 2022-03-04 DIAGNOSIS — M35.9 CONNECTIVE TISSUE DISEASE (HCC): ICD-10-CM

## 2022-03-04 LAB
BACTERIA UR QL AUTO: NORMAL /HPF
BILIRUB UR QL STRIP: NEGATIVE
CLARITY UR: CLEAR
COLOR UR: NORMAL
GLUCOSE UR STRIP-MCNC: NEGATIVE MG/DL
HGB UR QL STRIP.AUTO: NEGATIVE
KETONES UR STRIP-MCNC: NEGATIVE MG/DL
LEUKOCYTE ESTERASE UR QL STRIP: NEGATIVE
NITRITE UR QL STRIP: NEGATIVE
NON-SQ EPI CELLS URNS QL MICRO: NORMAL /HPF
PH UR STRIP.AUTO: 7.5 [PH]
PROT UR STRIP-MCNC: NEGATIVE MG/DL
RBC #/AREA URNS AUTO: NORMAL /HPF
SP GR UR STRIP.AUTO: 1.02 (ref 1–1.03)
UROBILINOGEN UR STRIP-ACNC: <2 MG/DL
WBC #/AREA URNS AUTO: NORMAL /HPF

## 2022-03-04 PROCEDURE — 3725F SCREEN DEPRESSION PERFORMED: CPT | Performed by: FAMILY MEDICINE

## 2022-03-04 PROCEDURE — 81001 URINALYSIS AUTO W/SCOPE: CPT | Performed by: FAMILY MEDICINE

## 2022-03-04 PROCEDURE — 1036F TOBACCO NON-USER: CPT | Performed by: FAMILY MEDICINE

## 2022-03-04 PROCEDURE — 99215 OFFICE O/P EST HI 40 MIN: CPT | Performed by: FAMILY MEDICINE

## 2022-03-04 PROCEDURE — 87086 URINE CULTURE/COLONY COUNT: CPT | Performed by: FAMILY MEDICINE

## 2022-03-04 PROCEDURE — 3008F BODY MASS INDEX DOCD: CPT | Performed by: FAMILY MEDICINE

## 2022-03-04 NOTE — PROGRESS NOTES
BMI Counseling: Body mass index is 29 29 kg/m²  The BMI is above normal  Nutrition recommendations include decreasing portion sizes, encouraging healthy choices of fruits and vegetables, limiting drinks that contain sugar and reducing intake of cholesterol  Exercise recommendations include exercising 3-5 times per week  No pharmacotherapy was ordered  Rationale for BMI follow-up plan is due to patient being overweight or obese  Depression Screening and Follow-up Plan: Patient was screened for depression during today's encounter  They screened negative with a PHQ-2 score of 0  Assessment/Plan:    Concern for chronicity of the condition and have history of kidney stone kidney infection and connective tissue disease will get CT scan abdomen pelvic to evaluate for the kidney/ kidney stone  Will get x-ray of her left hip  If all imaging is normal will proceed with MRI of thoracic spine since she have scoliosis  She will consider for pain management for evaluation the pain persist   She will need blood work further if imaging is negative  Recommendation for colonoscopy and shingle vaccine when she turns 52  Will also check urine for analysis and culture    I have spent 40 minutes with Patient  today in which greater than 50% of this time was spent in counseling/coordination of care regarding Prognosis, Risks and benefits of tx options and Intructions for management           Problem List Items Addressed This Visit        Nervous and Auditory    Lumbosacral radiculopathy - Primary       Musculoskeletal and Integument    Scoliosis    Relevant Orders    XR hip/pelv 2-3 vws left if performed    CT abdomen pelvis wo contrast       Other    Connective tissue disease ([address] )    Relevant Orders    CT abdomen pelvis wo contrast    Left flank pain    Relevant Orders    Urinalysis with microscopic    Urine culture    CT abdomen pelvis wo contrast    Left hip pain    Relevant Orders    XR hip/pelv 2-3 vws left if performed Other Visit Diagnoses     History of kidney stones        Relevant Orders    Urinalysis with microscopic    Urine culture    CT abdomen pelvis wo contrast    History of kidney infection        Relevant Orders    Urinalysis with microscopic    Urine culture    CT abdomen pelvis wo contrast    BMI 29 0-29 9,adult                Subjective:      Patient ID: Lillie Gonzalez is a 49 y o  female  60-year-old female presenting complain left flank pain left hip pain for the past 6 months  One year ago she fell and herniated disc in her neck  She has seen orthopedic had done physical therapy but that make her pain worse  There was no MRI of the neck was done  She stop physical therapy and has been using CBD oil and walking and the pain get better  Five years ago she was found to have herniated disc in her lower back MRI confirmed this  She did physical therapy and that helped  She has connective tissue disease follow-up with rheumatologist   She was on hydroxychloroquine but medication give her rash on her face that she stopped and she exercise a lot do yoga and lost weight and eating better so her pain better controlled until COVID hit and she had COVID infection and she works sitting in a chair for 10 hours and that worsening her pain  Six months ago the left flank pain came on suddenly dull aching pain it is constant and it more frequently now  Worse when she is sitting for while  She has not had to take medication no change in bowel movement or urination but sometime it radiated to her front of her abdomen and down to her left hip she is concerned because she had history of kidney infection and kidney stones in the past   No blood in the urine that she can no    She saw her gyn because she found to have ovarian cyst in the past had to get surgery she had an ultrasound that showed no ovarian cyst   No numbness no tingling no weakness in her legs she did have scoliosis on the left side of her back she had to wear back brace since she was 16up to 16years old  No shortness of breath no chest pain    Back Pain  Pertinent negatives include no abdominal pain, chest pain, dysuria, headaches, numbness, pelvic pain or weakness  Medication Refill  Associated symptoms include arthralgias  Pertinent negatives include no abdominal pain, chest pain, coughing, fatigue, headaches, joint swelling, myalgias, nausea, numbness, rash, sore throat, vomiting or weakness  The following portions of the patient's history were reviewed and updated as appropriate:   Past Medical History:  She has a past medical history of Connective tissue disease ([address] ), Heart murmur, History of hepatitis C (5/1/2020), Lumbosacral radiculopathy (5/1/2020), MRSA (methicillin resistant Staphylococcus aureus), Scoliosis, and Vitamin D deficiency (5/1/2020)  ,  _______________________________________________________________________  Medical Problems:  does not have any pertinent problems on file ,  _______________________________________________________________________  Past Surgical History:   has a past surgical history that includes Other surgical history (2014) and Ovarian cyst removal (2009)  ,  _______________________________________________________________________  Family History:  family history includes No Known Problems in her brother, father, maternal aunt, maternal grandfather, maternal uncle, mother, paternal aunt, paternal grandfather, paternal grandmother, paternal uncle, and sister; Ovarian cancer in her maternal grandmother ,  _______________________________________________________________________  Social History:   reports that she has never smoked  She has never used smokeless tobacco  She reports current alcohol use  She reports that she does not use drugs  ,  _______________________________________________________________________  Allergies:  has No Known Allergies     _______________________________________________________________________  Current Outpatient Medications   Medication Sig Dispense Refill    diclofenac (VOLTAREN) 75 mg EC tablet take 1 tablet by mouth twice a day (Patient taking differently: As needed ) 60 tablet 3    hydrOXYzine HCL (ATARAX) 10 mg tablet Take 10 mg by mouth every 6 (six) hours as needed for itching      methocarbamol (ROBAXIN) 500 mg tablet Take 1 tablet (500 mg total) by mouth daily at bedtime as needed for muscle spasms 30 tablet 1    hydrOXYzine HCL (ATARAX) 10 mg tablet Take 10 mg by mouth       No current facility-administered medications for this visit      _______________________________________________________________________  Review of Systems   Constitutional: Negative for activity change, appetite change, fatigue and unexpected weight change  HENT: Negative for ear pain, sore throat, trouble swallowing and voice change  Eyes: Negative for photophobia and visual disturbance  Respiratory: Negative for cough, chest tightness, shortness of breath and wheezing  Cardiovascular: Negative for chest pain, palpitations and leg swelling  Gastrointestinal: Negative for abdominal pain, constipation, diarrhea, nausea, rectal pain and vomiting  Endocrine: Negative for cold intolerance, polydipsia and polyuria  Genitourinary: Negative for difficulty urinating, dysuria, flank pain, menstrual problem and pelvic pain  Musculoskeletal: Positive for arthralgias, back pain and gait problem  Negative for joint swelling and myalgias  Skin: Negative for color change and rash  Allergic/Immunologic: Negative for environmental allergies and immunocompromised state  Neurological: Negative for dizziness, weakness, numbness and headaches  Hematological: Negative for adenopathy  Does not bruise/bleed easily     Psychiatric/Behavioral: Negative for decreased concentration, dysphoric mood, self-injury, sleep disturbance and suicidal ideas  The patient is not nervous/anxious  Objective:  Vitals:    03/04/22 0808   BP: 116/84   BP Location: Left arm   Patient Position: Sitting   Cuff Size: Standard   Pulse: 84   Temp: (!) 97 4 °F (36 3 °C)   TempSrc: Tympanic   SpO2: 98%   Weight: 84 8 kg (187 lb)   Height: 5' 7" (1 702 m)     Body mass index is 29 29 kg/m²  Physical Exam  Vitals and nursing note reviewed  Constitutional:       Appearance: Normal appearance  She is well-developed  HENT:      Head: Normocephalic and atraumatic  Right Ear: Tympanic membrane, ear canal and external ear normal       Left Ear: Tympanic membrane, ear canal and external ear normal       Nose: Nose normal       Mouth/Throat:      Mouth: Mucous membranes are dry  Pharynx: Oropharynx is clear  No oropharyngeal exudate  Eyes:      Extraocular Movements: Extraocular movements intact  Pupils: Pupils are equal, round, and reactive to light  Neck:      Thyroid: No thyromegaly  Cardiovascular:      Rate and Rhythm: Normal rate and regular rhythm  Heart sounds: Normal heart sounds  No murmur heard  Pulmonary:      Effort: Pulmonary effort is normal  No respiratory distress  Breath sounds: Normal breath sounds  No wheezing or rales  Abdominal:      General: Bowel sounds are normal  There is no distension  Palpations: Abdomen is soft  Tenderness: There is no abdominal tenderness  There is no guarding  Genitourinary:     Vagina: Normal    Musculoskeletal:         General: No swelling, tenderness or deformity  Normal range of motion  Cervical back: Normal range of motion and neck supple  Right lower leg: No edema  Left lower leg: No edema  Comments: Straight leg raise negative normal reflex normal strength upper back curvature of the spine to the left side   Skin:     General: Skin is warm and dry  Capillary Refill: Capillary refill takes less than 2 seconds  Findings: No rash  Neurological:      General: No focal deficit present  Mental Status: She is alert and oriented to person, place, and time  Mental status is at baseline  Psychiatric:         Mood and Affect: Mood normal          Behavior: Behavior normal          Thought Content:  Thought content normal          Judgment: Judgment normal

## 2022-03-05 LAB — BACTERIA UR CULT: NORMAL

## 2022-03-08 ENCOUNTER — HOSPITAL ENCOUNTER (OUTPATIENT)
Dept: RADIOLOGY | Facility: HOSPITAL | Age: 52
Discharge: HOME | End: 2022-03-08
Attending: FAMILY MEDICINE
Payer: COMMERCIAL

## 2022-03-08 ENCOUNTER — TELEPHONE (OUTPATIENT)
Dept: FAMILY MEDICINE CLINIC | Facility: CLINIC | Age: 52
End: 2022-03-08

## 2022-03-08 ENCOUNTER — HOSPITAL ENCOUNTER (OUTPATIENT)
Dept: CT IMAGING | Facility: HOSPITAL | Age: 52
Discharge: HOME | End: 2022-03-08
Attending: FAMILY MEDICINE
Payer: COMMERCIAL

## 2022-03-08 DIAGNOSIS — M41.9 SCOLIOSIS, UNSPECIFIED SCOLIOSIS TYPE, UNSPECIFIED SPINAL REGION: ICD-10-CM

## 2022-03-08 DIAGNOSIS — Z87.442 HISTORY OF KIDNEY STONES: ICD-10-CM

## 2022-03-08 DIAGNOSIS — M25.552 LEFT HIP PAIN: ICD-10-CM

## 2022-03-08 DIAGNOSIS — Z87.440 HISTORY OF KIDNEY INFECTION: ICD-10-CM

## 2022-03-08 DIAGNOSIS — M35.9 CONNECTIVE TISSUE DISEASE (HCC): ICD-10-CM

## 2022-03-08 DIAGNOSIS — R10.9 LEFT FLANK PAIN: ICD-10-CM

## 2022-03-08 PROCEDURE — G1004 CDSM NDSC: HCPCS

## 2022-03-08 PROCEDURE — 74176 CT ABD & PELVIS W/O CONTRAST: CPT

## 2022-03-08 PROCEDURE — 73502 X-RAY EXAM HIP UNI 2-3 VIEWS: CPT

## 2022-03-09 ENCOUNTER — TELEPHONE (OUTPATIENT)
Dept: FAMILY MEDICINE CLINIC | Facility: CLINIC | Age: 52
End: 2022-03-09

## 2022-03-09 DIAGNOSIS — M54.17 LUMBOSACRAL RADICULOPATHY: Primary | ICD-10-CM

## 2022-03-09 DIAGNOSIS — M41.9 SCOLIOSIS, UNSPECIFIED SCOLIOSIS TYPE, UNSPECIFIED SPINAL REGION: ICD-10-CM

## 2022-03-09 DIAGNOSIS — M35.9 CONNECTIVE TISSUE DISEASE (HCC): ICD-10-CM

## 2022-03-09 NOTE — TELEPHONE ENCOUNTER
----- Message from Kendra Stroud MD sent at 3/9/2022  8:29 AM EST -----  Please let pt know of ct abdomen and pelvic and xr hip are all normal
Message left on patient's voicemail with recommendation  Relayed the number for Central Scheduling  Advised patient to call back with any additional questions 
Please let pt know I ordered for mri of thoracic and lumbar spine, orders in chart
Spoke with patient - results given  She would like to know what her next steps will be  Please advise 
FAMILY HISTORY:  No pertinent family history in first degree relatives

## 2022-06-13 ENCOUNTER — OFFICE VISIT (OUTPATIENT)
Dept: FAMILY MEDICINE CLINIC | Facility: CLINIC | Age: 52
End: 2022-06-13
Payer: COMMERCIAL

## 2022-06-13 VITALS
SYSTOLIC BLOOD PRESSURE: 118 MMHG | HEART RATE: 76 BPM | BODY MASS INDEX: 29.19 KG/M2 | TEMPERATURE: 98 F | WEIGHT: 186 LBS | OXYGEN SATURATION: 98 % | DIASTOLIC BLOOD PRESSURE: 82 MMHG | HEIGHT: 67 IN

## 2022-06-13 DIAGNOSIS — R68.84 JAW PAIN: Primary | ICD-10-CM

## 2022-06-13 DIAGNOSIS — R68.84 JAW PAIN, NON-TMJ: ICD-10-CM

## 2022-06-13 PROCEDURE — 3008F BODY MASS INDEX DOCD: CPT | Performed by: FAMILY MEDICINE

## 2022-06-13 PROCEDURE — 99213 OFFICE O/P EST LOW 20 MIN: CPT | Performed by: FAMILY MEDICINE

## 2022-06-13 PROCEDURE — 1036F TOBACCO NON-USER: CPT | Performed by: FAMILY MEDICINE

## 2022-06-13 RX ORDER — AMOXICILLIN 500 MG/1
500 CAPSULE ORAL 3 TIMES DAILY
COMMUNITY
Start: 2022-06-02

## 2022-06-13 NOTE — ASSESSMENT & PLAN NOTE
Came  In  To  Get checked   For having  Mild  Pain  On  Both  Side  Mandibular  Joints    No  Swelling  Induration  Or  Any  Other  Symptoms    Like  Fever   Myalgias   Or  Any  Other  assoceated   Symptoms   She  Is  Concern  Because   She   Had  A  Tiny  Ear  Puncture   In  Her  Shoe  In  [place]  3  And   A  Half  Weeks  Ago  She  Had her  Last  tetnus  Shot  2008  She recently had  A  Dental infection  And  Finished  The  Antibiotics   She  Has  Seen  Her dentist  Recently       On  Exam  There  Is no  Tenderness   Or  Any  Abnormalities  On exam     asked  Her  To  Go  To  The  ER if  Her  Symptoms  Get  Worst  And  Follow up  With  Her  PCP Dr Jeff Langston   For  Now  Take   advil  400 mg tid  Prn   Will get  The  X ray  Jaw   And  If  Symptoms  Don't  Improve    Follow  Up  With  Your  Dental  Surgeon /  Go to the ER  If  Any  systemic  Symptoms  Occur      currently  No  Alarming  Symptoms and   Her exam  Is   Not  alarming

## 2022-06-13 NOTE — PROGRESS NOTES
Assessment/Plan:  As  below       Problem List Items Addressed This Visit        Other    Jaw pain, non-TMJ     Came  In  To  Get checked   For having  Mild  Pain  On  Both  Side  Mandibular  Joints    No  Swelling  Induration  Or  Any  Other  Symptoms    Like  Fever   Myalgias   Or  Any  Other  assoceated   Symptoms   She  Is  Concern  Because   She   Had  A  Tiny  Ear  Puncture   In  Her  Shoe  In  [place]  3  And   A  Half  Weeks  Ago  She  Had her  Last  tetnus  Shot  2008  She recently had  A  Dental infection  And  Finished  The  Antibiotics   She  Has  Seen  Her dentist  Recently       On  Exam  There  Is no  Tenderness   Or  Any  Abnormalities  On exam     asked  Her  To  Go  To  The  ER if  Her  Symptoms  Get  Worst  And  Follow up  With  Her  PCP Dr Antonio Christianson   For  Now  Take   advil  400 mg tid  Prn   Will get  The  X ray  Jaw   And  If  Symptoms  Don't  Improve    Follow  Up  With  Your  Dental  Surgeon /  Go to the ER  If  Any  systemic  Symptoms  Occur   currently  No  Alarming  Symptoms and   Her exam  Is   Not  alarming             Other Visit Diagnoses     Jaw pain    -  Primary            Subjective:      Patient ID: Sharmin Manzo is a 47 y o  female  HPI    The following portions of the patient's history were reviewed and updated as appropriate:   Past Medical History:  She has a past medical history of Connective tissue disease ([address] ), Heart murmur, History of hepatitis C (5/1/2020), Lumbosacral radiculopathy (5/1/2020), MRSA (methicillin resistant Staphylococcus aureus), Scoliosis, and Vitamin D deficiency (5/1/2020)  ,  _______________________________________________________________________  Medical Problems:  does not have any pertinent problems on file ,  _______________________________________________________________________  Past Surgical History:   has a past surgical history that includes Other surgical history (2014) and Ovarian cyst removal (2009)  ,  _______________________________________________________________________  Family History:  family history includes No Known Problems in her brother, father, maternal aunt, maternal grandfather, maternal uncle, mother, paternal aunt, paternal grandfather, paternal grandmother, paternal uncle, and sister; Ovarian cancer in her maternal grandmother ,  _______________________________________________________________________  Social History:   reports that she has never smoked  She has never used smokeless tobacco  She reports current alcohol use  She reports that she does not use drugs  ,  _______________________________________________________________________  Allergies:  has No Known Allergies     _______________________________________________________________________  Current Outpatient Medications   Medication Sig Dispense Refill    hydrOXYzine HCL (ATARAX) 10 mg tablet Take 10 mg by mouth every 6 (six) hours as needed for itching      methocarbamol (ROBAXIN) 500 mg tablet Take 1 tablet (500 mg total) by mouth daily at bedtime as needed for muscle spasms 30 tablet 1    amoxicillin (AMOXIL) 500 mg capsule Take 500 mg by mouth 3 (three) times a day (Patient not taking: Reported on 6/13/2022)      diclofenac (VOLTAREN) 75 mg EC tablet take 1 tablet by mouth twice a day (Patient not taking: Reported on 6/13/2022) 60 tablet 3     No current facility-administered medications for this visit      _______________________________________________________________________  Review of Systems   Constitutional: Negative for chills and fatigue  HENT: Negative for congestion and sinus pain  Eyes: Negative for pain, discharge and itching  Respiratory: Negative for cough  Cardiovascular: Negative for chest pain, palpitations and leg swelling  Gastrointestinal: Negative for abdominal distention, anal bleeding and constipation  Endocrine: Negative for cold intolerance, heat intolerance and polydipsia  Genitourinary: Negative for dysuria and pelvic pain  Musculoskeletal: Negative for arthralgias and joint swelling  Skin: Negative for rash  Neurological: Negative for dizziness and headaches  Psychiatric/Behavioral: The patient is not nervous/anxious  Objective:  Vitals:    06/13/22 1737   BP: 118/82   BP Location: Right arm   Patient Position: Sitting   Cuff Size: Standard   Pulse: 76   Temp: 98 °F (36 7 °C)   TempSrc: Tympanic   SpO2: 98%   Weight: 84 4 kg (186 lb)   Height: 5' 7" (1 702 m)     Body mass index is 29 13 kg/m²  Physical Exam  Vitals and nursing note reviewed  Constitutional:       General: She is not in acute distress  Appearance: She is not ill-appearing, toxic-appearing or diaphoretic  HENT:      Head: Normocephalic  Right Ear: Tympanic membrane and ear canal normal       Left Ear: Tympanic membrane and ear canal normal       Nose: Nose normal  No congestion  Mouth/Throat:      Mouth: Mucous membranes are moist       Pharynx: No oropharyngeal exudate or posterior oropharyngeal erythema  Comments: Jaw  Pain   On  The  mendibular  Joints   But  None currently  Eyes:      Extraocular Movements: Extraocular movements intact  Pupils: Pupils are equal, round, and reactive to light  Cardiovascular:      Rate and Rhythm: Normal rate and regular rhythm  Pulses: Normal pulses  Heart sounds: Normal heart sounds  No murmur heard  No gallop  Pulmonary:      Effort: Pulmonary effort is normal       Breath sounds: Normal breath sounds  No wheezing  Abdominal:      General: There is no distension  Musculoskeletal:      Right lower leg: No edema  Left lower leg: No edema  Skin:     Findings: No erythema or rash  Neurological:      General: No focal deficit present  Mental Status: She is alert and oriented to person, place, and time     Psychiatric:         Mood and Affect: Mood normal          Behavior: Behavior normal  Thought Content:  Thought content normal

## 2023-10-31 ENCOUNTER — HOSPITAL ENCOUNTER (OUTPATIENT)
Dept: ULTRASOUND IMAGING | Facility: CLINIC | Age: 53
Discharge: HOME | End: 2023-10-31
Payer: COMMERCIAL

## 2023-10-31 DIAGNOSIS — R92.30 DENSE BREASTS: ICD-10-CM

## 2023-10-31 DIAGNOSIS — R92.2 DENSE BREASTS: ICD-10-CM

## 2023-10-31 PROCEDURE — 76641 ULTRASOUND BREAST COMPLETE: CPT

## 2024-01-31 ENCOUNTER — OFFICE VISIT (OUTPATIENT)
Dept: URGENT CARE | Facility: CLINIC | Age: 54
End: 2024-01-31
Payer: COMMERCIAL

## 2024-01-31 VITALS
DIASTOLIC BLOOD PRESSURE: 82 MMHG | WEIGHT: 195.4 LBS | RESPIRATION RATE: 20 BRPM | SYSTOLIC BLOOD PRESSURE: 120 MMHG | HEART RATE: 94 BPM | TEMPERATURE: 98.1 F | OXYGEN SATURATION: 97 % | HEIGHT: 67 IN | BODY MASS INDEX: 30.67 KG/M2

## 2024-01-31 DIAGNOSIS — J01.90 ACUTE BACTERIAL SINUSITIS: Primary | ICD-10-CM

## 2024-01-31 DIAGNOSIS — H69.92 DISORDER OF LEFT EUSTACHIAN TUBE: ICD-10-CM

## 2024-01-31 DIAGNOSIS — B96.89 ACUTE BACTERIAL SINUSITIS: Primary | ICD-10-CM

## 2024-01-31 PROBLEM — R91.1 LUNG NODULE SEEN ON IMAGING STUDY: Status: ACTIVE | Noted: 2024-01-10

## 2024-01-31 PROBLEM — K76.0 FATTY LIVER: Status: ACTIVE | Noted: 2024-01-10

## 2024-01-31 PROBLEM — M25.472 SWELLING OF BOTH ANKLES: Status: ACTIVE | Noted: 2020-09-09

## 2024-01-31 PROBLEM — M25.471 SWELLING OF BOTH ANKLES: Status: ACTIVE | Noted: 2020-09-09

## 2024-01-31 PROBLEM — M35.9 SYSTEMIC INVOLVEMENT OF CONNECTIVE TISSUE, UNSPECIFIED (HCC): Status: ACTIVE | Noted: 2020-09-09

## 2024-01-31 PROCEDURE — 99213 OFFICE O/P EST LOW 20 MIN: CPT | Performed by: FAMILY MEDICINE

## 2024-01-31 RX ORDER — FLUTICASONE PROPIONATE 50 MCG
1 SPRAY, SUSPENSION (ML) NASAL DAILY
Qty: 9.9 ML | Refills: 0 | Status: SHIPPED | OUTPATIENT
Start: 2024-01-31

## 2024-01-31 RX ORDER — AMOXICILLIN AND CLAVULANATE POTASSIUM 875; 125 MG/1; MG/1
1 TABLET, FILM COATED ORAL EVERY 12 HOURS SCHEDULED
Qty: 14 TABLET | Refills: 0 | Status: SHIPPED | OUTPATIENT
Start: 2024-01-31 | End: 2024-02-07

## 2024-01-31 NOTE — PROGRESS NOTES
[facility] Now        NAME: Marcie Goncalves is a 53 y.o. female  : 1970    MRN: [id number]  DATE: 2024  TIME: 6:38 PM    Assessment and Plan   Acute bacterial sinusitis [J01.90, B96.89]  1. Acute bacterial sinusitis  amoxicillin-clavulanate (AUGMENTIN) 875-125 mg per tablet    fluticasone (FLONASE) 50 mcg/act nasal spray      2. Disorder of left eustachian tube  amoxicillin-clavulanate (AUGMENTIN) 875-125 mg per tablet    fluticasone (FLONASE) 50 mcg/act nasal spray            Patient Instructions     Patient Instructions   - there are no signs of an acute ear infection present on exam at this time, ear discomfort is likely due to eustachian tube dysfunction of the left ear.  - Augmentin prescribed for sinus infection, complete as directed   - Flonase nasal spray prescribed, use as directed to help with nasal/sinus and ear symptoms   - take Tylenol or Motrin as needed for pain/fever   - drink plenty of fluids and run a humidifier at home   - try warm salt water gargles and throat lozenges as needed   - if symptoms persist despite treatment or worsen, follow up w/ PCP office for re-check     Follow up with PCP in 3-5 days.  Proceed to  ER if symptoms worsen.    Chief Complaint     Chief Complaint   Patient presents with    Cold Like Symptoms     Pt ill x 10 days, congestion and sinus pressure, then left ear pain started today.  Pt used Sudafed and Theraflu. Home Covid test 1 week ago was neg.         History of Present Illness       52 yo female presents c/o sinus pressure, nasal congestion, rhinorrhea, and post-nasal drip x 10 days. She states she began feeling a pressure like sensation and discomfort in her L ear this morning. No fever/chills. No headache or body aches. No drainage from the ear. No chest pain, SOB, or wheezing. Patient is not a smoker. No loss of taste or smell. She has no known allergies. She has been taking Theraflu and Sudafed as needed for the symptoms.      Review of  Systems   Review of Systems   Constitutional: Negative.    HENT:  Positive for congestion, ear pain, postnasal drip, rhinorrhea, sinus pressure and sinus pain.         As noted in HPI   Eyes: Negative.    Respiratory: Negative.     Cardiovascular: Negative.    Gastrointestinal: Negative.    Musculoskeletal: Negative.    Skin: Negative.    Allergic/Immunologic: Negative.    Neurological: Negative.    Hematological: Negative.          Current Medications       Current Outpatient Medications:     amoxicillin-clavulanate (AUGMENTIN) 875-125 mg per tablet, Take 1 tablet by mouth every 12 (twelve) hours for 7 days, Disp: 14 tablet, Rfl: 0    Cholecalciferol 50 MCG (2000 UT) CAPS, Take 1 capsule by mouth daily, Disp: , Rfl:     diclofenac (VOLTAREN) 75 mg EC tablet, take 1 tablet by mouth twice a day, Disp: 60 tablet, Rfl: 3    ELDERBERRY PO, Take by mouth, Disp: , Rfl:     fluticasone (FLONASE) 50 mcg/act nasal spray, 1 spray into each nostril daily, Disp: 9.9 mL, Rfl: 0    hydrOXYzine HCL (ATARAX) 10 mg tablet, Take 10 mg by mouth every 6 (six) hours as needed for itching, Disp: , Rfl:     Current Allergies     Allergies as of 01/31/2024    (No Known Allergies)            The following portions of the patient's history were reviewed and updated as appropriate: allergies, current medications, past family history, past medical history, past social history, past surgical history and problem list.     Past Medical History:   Diagnosis Date    Connective tissue disease (HCC)     Heart murmur     History of hepatitis C 5/1/2020    Lumbosacral radiculopathy 5/1/2020    MRSA (methicillin resistant Staphylococcus aureus)     X2    Scoliosis     Vitamin D deficiency 5/1/2020       Past Surgical History:   Procedure Laterality Date    OTHER SURGICAL HISTORY  2014    ovarian tumer removed, benign    OVARIAN CYST REMOVAL  2009       Family History   Problem Relation Age of Onset    No Known Problems Mother     No Known Problems  "Father     No Known Problems Sister     No Known Problems Sister     No Known Problems Daughter     No Known Problems Daughter     Ovarian cancer Maternal Grandmother     No Known Problems Maternal Grandfather     Cancer Paternal Grandmother         bladder    No Known Problems Paternal Grandfather     No Known Problems Brother     No Known Problems Maternal Aunt     No Known Problems Maternal Uncle     No Known Problems Paternal Aunt     No Known Problems Paternal Uncle     ADD / ADHD Neg Hx     Anesthesia problems Neg Hx     Clotting disorder Neg Hx     Collagen disease Neg Hx     Diabetes Neg Hx     Dislocations Neg Hx     Learning disabilities Neg Hx     Neurological problems Neg Hx     Osteoporosis Neg Hx     Rheumatologic disease Neg Hx     Scoliosis Neg Hx     Vascular Disease Neg Hx          Medications have been verified.        Objective   /82   Pulse 94   Temp 98.1 °F (36.7 °C)   Resp 20   Ht 5' 7\" (1.702 m)   Wt 88.6 kg (195 lb 6.4 oz)   SpO2 97%   BMI 30.60 kg/m²   No LMP recorded. Patient is perimenopausal.       Physical Exam     Physical Exam  Vitals and nursing note reviewed.   Constitutional:       General: She is awake. She is not in acute distress.     Appearance: Normal appearance. She is well-developed and well-groomed. She is not ill-appearing, toxic-appearing or diaphoretic.   HENT:      Head: Normocephalic and atraumatic.      Jaw: There is normal jaw occlusion.      Right Ear: Tympanic membrane, ear canal and external ear normal.      Left Ear: Ear canal and external ear normal. There is no impacted cerumen. No foreign body. No mastoid tenderness. Tympanic membrane is erythematous. Tympanic membrane is not perforated or bulging.      Ears:      Comments: Slight erythema of the left TM, otherwise clear.     Nose: Mucosal edema present.      Mouth/Throat:      Lips: Pink. No lesions.      Mouth: Mucous membranes are moist.      Pharynx: Oropharynx is clear. Uvula midline.   Eyes: "      General: Lids are normal.      Conjunctiva/sclera: Conjunctivae normal.   Neck:      Trachea: Trachea and phonation normal.   Cardiovascular:      Rate and Rhythm: Normal rate and regular rhythm.      Pulses: Normal pulses.      Heart sounds: Normal heart sounds.   Pulmonary:      Effort: Pulmonary effort is normal. No tachypnea, accessory muscle usage or respiratory distress.      Breath sounds: Normal breath sounds and air entry.   Musculoskeletal:      Cervical back: Neck supple. No edema, erythema, rigidity or tenderness.   Lymphadenopathy:      Cervical: No cervical adenopathy.   Skin:     General: Skin is warm and dry.      Capillary Refill: Capillary refill takes less than 2 seconds.      Coloration: Skin is not pale.   Neurological:      Mental Status: She is alert and oriented to person, place, and time. Mental status is at baseline.   Psychiatric:         Mood and Affect: Mood normal.         Behavior: Behavior normal. Behavior is cooperative.         Thought Content: Thought content normal.         Judgment: Judgment normal.

## 2024-01-31 NOTE — PATIENT INSTRUCTIONS
- there are no signs of an acute ear infection present on exam at this time, ear discomfort is likely due to eustachian tube dysfunction of the left ear.  - Augmentin prescribed for sinus infection, complete as directed   - Flonase nasal spray prescribed, use as directed to help with nasal/sinus and ear symptoms   - take Tylenol or Motrin as needed for pain/fever   - drink plenty of fluids and run a humidifier at home   - try warm salt water gargles and throat lozenges as needed   - if symptoms persist despite treatment or worsen, follow up w/ PCP office for re-check

## 2024-03-29 ENCOUNTER — TELEPHONE (OUTPATIENT)
Dept: FAMILY MEDICINE CLINIC | Facility: CLINIC | Age: 54
End: 2024-03-29

## 2024-03-29 NOTE — TELEPHONE ENCOUNTER
Left a message explaining that we would need to reschedule patient's New Patient appointment made for 4/1, because it was not scheduled in an appropriate spot for a new patient appointment     Please transfer to office so we may schedule her on a day where Dr. Gerard does not already have a new patient establishing care with her. As of right now, her next available New Patient appointment is on 4/16

## 2024-04-18 ENCOUNTER — OFFICE VISIT (OUTPATIENT)
Dept: FAMILY MEDICINE CLINIC | Facility: CLINIC | Age: 54
End: 2024-04-18
Payer: COMMERCIAL

## 2024-04-18 VITALS
DIASTOLIC BLOOD PRESSURE: 82 MMHG | HEIGHT: 67 IN | OXYGEN SATURATION: 97 % | WEIGHT: 192.4 LBS | HEART RATE: 84 BPM | BODY MASS INDEX: 30.2 KG/M2 | SYSTOLIC BLOOD PRESSURE: 124 MMHG | TEMPERATURE: 97.4 F

## 2024-04-18 DIAGNOSIS — Z00.00 ANNUAL PHYSICAL EXAM: Primary | ICD-10-CM

## 2024-04-18 DIAGNOSIS — L29.9 PRURITUS: ICD-10-CM

## 2024-04-18 DIAGNOSIS — R01.1 HEART MURMUR: ICD-10-CM

## 2024-04-18 DIAGNOSIS — Z78.0 MENOPAUSE: ICD-10-CM

## 2024-04-18 DIAGNOSIS — M35.9 CONNECTIVE TISSUE DISEASE (HCC): ICD-10-CM

## 2024-04-18 DIAGNOSIS — M54.16 LUMBAR RADICULOPATHY: ICD-10-CM

## 2024-04-18 DIAGNOSIS — R00.2 PALPITATIONS: ICD-10-CM

## 2024-04-18 PROCEDURE — 99396 PREV VISIT EST AGE 40-64: CPT | Performed by: FAMILY MEDICINE

## 2024-04-18 PROCEDURE — 99214 OFFICE O/P EST MOD 30 MIN: CPT | Performed by: FAMILY MEDICINE

## 2024-04-18 RX ORDER — METHOCARBAMOL 500 MG/1
500 TABLET, FILM COATED ORAL 3 TIMES DAILY
Qty: 30 TABLET | Refills: 3 | Status: SHIPPED | OUTPATIENT
Start: 2024-04-18

## 2024-04-18 RX ORDER — DULOXETIN HYDROCHLORIDE 30 MG/1
CAPSULE, DELAYED RELEASE ORAL
Qty: 50 CAPSULE | Refills: 5 | Status: SHIPPED | OUTPATIENT
Start: 2024-04-18

## 2024-04-18 RX ORDER — HYDROXYZINE HYDROCHLORIDE 10 MG/1
10 TABLET, FILM COATED ORAL EVERY 6 HOURS PRN
Qty: 30 TABLET | Refills: 3 | Status: SHIPPED | OUTPATIENT
Start: 2024-04-18

## 2024-04-18 NOTE — PROGRESS NOTES
ADULT ANNUAL PHYSICAL  [facility]    NAME: Marcie Goncalves  AGE: 54 y.o. SEX: female  : 1970     DATE: 2024     Assessment and Plan:     Problem List Items Addressed This Visit    None      Immunizations and preventive care screenings were discussed with patient today. Appropriate education was printed on patient's after visit summary.    Counseling:  {Annual Physical; Counselin}         No follow-ups on file.     Chief Complaint:     Chief Complaint   Patient presents with    \[facility] [place] [facility]\"" Care    Physical Exam      History of Present Illness:     Adult Annual Physical   Patient here for a comprehensive physical exam. The patient reports {problems:99073}.    Diet and Physical Activity  Diet/Nutrition: {annual physical; diet:43814073}.   Exercise: {annual physical; exercise:81188673}.      Depression Screening  PHQ-2/9 Depression Screening    Little interest or pleasure in doing things: 0 - not at all  Feeling down, depressed, or hopeless: 0 - not at all  PHQ-2 Score: 0  PHQ-2 Interpretation: Negative depression screen       General Health  Sleep: {annual physical; sleep:2102}.   Hearing: {annual physical; hearin}.  Vision: {annual physical; vision:}.   Dental: {annual physical; dental:58031178}.       /GYN Health  Follows with gynecology? {YES/NO:}   Patient is: {Menopause:98983}  Last menstrual period: ***  Contraceptive method: {contraceptive options:}.    Advanced Care Planning  Do you have an advanced directive? {YES/NO:}  Do you have a durable medical power of ? {YES/NO:}  ACP document given to the patient? {YES/NO:}     Review of Systems:     Review of Systems   Past Medical History:     Past Medical History:   Diagnosis Date    Connective tissue disease (HCC)     Heart murmur     History of hepatitis C 2020    Lumbosacral radiculopathy 2020    MRSA (methicillin  resistant Staphylococcus aureus)     X2    Scoliosis     Vitamin D deficiency 2020      Past Surgical History:     Past Surgical History:   Procedure Laterality Date    OTHER SURGICAL HISTORY  2014    ovarian tumer removed, benign    OVARIAN CYST REMOVAL        Social History:     Social History     Socioeconomic History    Marital status:      Spouse name: None    Number of children: None    Years of education: None    Highest education level: None   Occupational History    Occupation: Teller   Tobacco Use    Smoking status: Never     Passive exposure: Past    Smokeless tobacco: Never   Vaping Use    Vaping status: Never Used   Substance and Sexual Activity    Alcohol use: Yes    Drug use: No    Sexual activity: Yes     Birth control/protection: OCP   Other Topics Concern    None   Social History Narrative    Most recent tobacco use screenin2019    Do you currently or have you served in the [employer]: No    Were you activated, into active duty, as a member of the [employer] or as a Reservist: No    Occupation: teller/    General stress level: Medium    Exercise level: Moderate    3-4 times a week    Diet: Regular    Marital status:     Sexual orientation: Heterosexual    Alcohol intake: Moderate    Caffeine intake: Moderate    Illicit drugs: no    Sexually active: Yes     Social Determinants of Health     Financial Resource Strain: Not on file   Food Insecurity: Not on file   Transportation Needs: Not on file   Physical Activity: Not on file   Stress: Not on file   Social Connections: Not on file   Intimate Partner Violence: Not on file   Housing Stability: Not on file      Family History:     Family History   Problem Relation Age of Onset    No Known Problems Mother     No Known Problems Father     No Known Problems Sister     No Known Problems Sister     No Known Problems Daughter     No Known Problems Daughter     Ovarian cancer Maternal Grandmother     No  "Known Problems Maternal Grandfather     Cancer Paternal Grandmother         bladder    No Known Problems Paternal Grandfather     No Known Problems Brother     No Known Problems Maternal Aunt     No Known Problems Maternal Uncle     No Known Problems Paternal Aunt     No Known Problems Paternal Uncle     ADD / ADHD Neg Hx     Anesthesia problems Neg Hx     Clotting disorder Neg Hx     Collagen disease Neg Hx     Diabetes Neg Hx     Dislocations Neg Hx     Learning disabilities Neg Hx     Neurological problems Neg Hx     Osteoporosis Neg Hx     Rheumatologic disease Neg Hx     Scoliosis Neg Hx     Vascular Disease Neg Hx       Current Medications:     Current Outpatient Medications   Medication Sig Dispense Refill    Cholecalciferol 50 MCG (2000 UT) CAPS Take 1 capsule by mouth daily      diclofenac (VOLTAREN) 75 mg EC tablet take 1 tablet by mouth twice a day 60 tablet 3    ELDERBERRY PO Take by mouth      fluticasone (FLONASE) 50 mcg/act nasal spray 1 spray into each nostril daily 9.9 mL 0    hydrOXYzine HCL (ATARAX) 10 mg tablet Take 10 mg by mouth every 6 (six) hours as needed for itching       No current facility-administered medications for this visit.      Allergies:     No Known Allergies   Physical Exam:     /82   Pulse 84   Temp (!) 97.4 °F (36.3 °C)   Ht 5' 7\" (1.702 m)   Wt 87.3 kg (192 lb 6.4 oz)   SpO2 97%   BMI 30.13 kg/m²     Physical Exam     PATRIA YORK  [facility]    "

## 2024-04-19 ENCOUNTER — TELEPHONE (OUTPATIENT)
Dept: ADMINISTRATIVE | Facility: OTHER | Age: 54
End: 2024-04-19

## 2024-04-19 NOTE — TELEPHONE ENCOUNTER
----- Message from Patria Wells sent at 4/17/2024  3:55 PM EDT -----  Regarding: Pap  04/17/24 3:55 PM    Hello, our patient Marcie Goncalves has had Pap Smear (HPV) aka Cervical Cancer Screening completed/performed. Please assist in updating the patient chart by pulling the Care Everywhere (CE) document. The date of service is 03/2024.     Thank you,  PATRIA WELLS  [facility]

## 2024-04-19 NOTE — PROGRESS NOTES
ADULT ANNUAL PHYSICAL  [facility]    NAME: Marcie Goncalves  AGE: 54 y.o. SEX: female  : 1970     DATE: 2024     Assessment and Plan:     Problem List Items Addressed This Visit          Rheumatology    Connective tissue disease (HCC)       Other    Heart murmur    Relevant Orders    Echo stress test, exercise     Other Visit Diagnoses       Annual physical exam    -  Primary    Pruritus        Relevant Medications    hydrOXYzine HCL (ATARAX) 10 mg tablet    Palpitations        Relevant Orders    Echo stress test, exercise    Lumbar radiculopathy        Relevant Medications    methocarbamol (ROBAXIN) 500 mg tablet    Other Relevant Orders    MRI lumbar spine wo contrast    Menopause        Relevant Medications    DULoxetine (CYMBALTA) 30 mg delayed release capsule            Immunizations and preventive care screenings were discussed with patient today. Appropriate education was printed on patient's after visit summary.    Counseling:  Exercise: the importance of regular exercise/physical activity was discussed. Recommend exercise 3-5 times per week for at least 30 minutes.          Return Tuesday night for physical.     Chief Complaint:     Chief Complaint   Patient presents with    Establish Care    Physical Exam      History of Present Illness:     Adult Annual Physical   Patient here for a comprehensive physical exam. The patient reports issues with a new diagnosis of scleroderma.  She has chronic pruritus associated with this and needs cardiac evaluation to make sure there is no involvement as well as sees for evaluation of a heart murmur.  She experiences chest pain and palpitations with exertion and has chronic lumbar radiculopathy and SI joint pain.  She is currently seeing pain management for injections.  In addition, she is experiencing menopausal symptoms; cannot go on hormone replacement therapy due to scleroderma causing  hypercoagulable state.  We discussed Cymbalta and she is willing to try this for her symptoms.    Diet and Physical Activity  Diet/Nutrition: well balanced diet.   Exercise: walking.      Depression Screening  PHQ-2/9 Depression Screening    Little interest or pleasure in doing things: 0 - not at all  Feeling down, depressed, or hopeless: 0 - not at all  PHQ-2 Score: 0  PHQ-2 Interpretation: Negative depression screen       General Health  Sleep: sleeps well.   Hearing: normal - bilateral.  Vision: no vision problems.   Dental: regular dental visits.       /GYN Health  Follows with gynecology? no   Patient is: postmenopausal  Last menstrual period:   Contraceptive method: .    Advanced Care Planning  Do you have an advanced directive? no  Do you have a durable medical power of ? no  ACP document given to the patient? no     Review of Systems:     Review of Systems   Constitutional:  Negative for appetite change, chills and fever.        + hot flashes. + mood swings   HENT:  Negative for ear pain, facial swelling, rhinorrhea, sinus pain, sore throat and trouble swallowing.    Eyes:  Negative for discharge and redness.   Respiratory:  Negative for chest tightness, shortness of breath and wheezing.    Cardiovascular:  Positive for palpitations. Negative for chest pain.        + chest pain with exertion and palpitations with exertion    Gastrointestinal:  Negative for abdominal pain, diarrhea, nausea and vomiting.   Endocrine: Negative for polyuria.   Genitourinary:  Negative for dysuria and urgency.   Musculoskeletal:  Positive for back pain. Negative for arthralgias.        + lumbar back pain with right leg radiculopathy   Skin:  Negative for rash.        + pruritis   Neurological:  Negative for dizziness, weakness and headaches.   Hematological:  Negative for adenopathy.   Psychiatric/Behavioral:  Negative for behavioral problems, confusion and sleep disturbance.    All other systems reviewed and are  negative.     Past Medical History:     Past Medical History:   Diagnosis Date    Connective tissue disease (HCC)     Heart murmur     History of hepatitis C 2020    Lumbosacral radiculopathy 2020    MRSA (methicillin resistant Staphylococcus aureus)     X2    Scoliosis     Vitamin D deficiency 2020      Past Surgical History:     Past Surgical History:   Procedure Laterality Date    OTHER SURGICAL HISTORY  2014    ovarian tumer removed, benign    OVARIAN CYST REMOVAL        Social History:     Social History     Socioeconomic History    Marital status:      Spouse name: None    Number of children: None    Years of education: None    Highest education level: None   Occupational History    Occupation: Teller   Tobacco Use    Smoking status: Never     Passive exposure: Past    Smokeless tobacco: Never   Vaping Use    Vaping status: Never Used   Substance and Sexual Activity    Alcohol use: Yes    Drug use: No    Sexual activity: Yes     Birth control/protection: OCP   Other Topics Concern    None   Social History Narrative    Most recent tobacco use screenin2019    Do you currently or have you served in the [employer]: No    Were you activated, into active duty, as a member of the [employer] or as a Reservist: No    Occupation: teller/    General stress level: Medium    Exercise level: Moderate    3-4 times a week    Diet: Regular    Marital status:     Sexual orientation: Heterosexual    Alcohol intake: Moderate    Caffeine intake: Moderate    Illicit drugs: no    Sexually active: Yes     Social Determinants of Health     Financial Resource Strain: Not on file   Food Insecurity: Not on file   Transportation Needs: Not on file   Physical Activity: Not on file   Stress: Not on file   Social Connections: Not on file   Intimate Partner Violence: Not on file   Housing Stability: Not on file      Family History:     Family History   Problem Relation Age of Onset     "No Known Problems Mother     No Known Problems Father     No Known Problems Sister     No Known Problems Sister     No Known Problems Daughter     No Known Problems Daughter     Ovarian cancer Maternal Grandmother     No Known Problems Maternal Grandfather     Cancer Paternal Grandmother         bladder    No Known Problems Paternal Grandfather     No Known Problems Brother     No Known Problems Maternal Aunt     No Known Problems Maternal Uncle     No Known Problems Paternal Aunt     No Known Problems Paternal Uncle     ADD / ADHD Neg Hx     Anesthesia problems Neg Hx     Clotting disorder Neg Hx     Collagen disease Neg Hx     Diabetes Neg Hx     Dislocations Neg Hx     Learning disabilities Neg Hx     Neurological problems Neg Hx     Osteoporosis Neg Hx     Rheumatologic disease Neg Hx     Scoliosis Neg Hx     Vascular Disease Neg Hx       Current Medications:     Current Outpatient Medications   Medication Sig Dispense Refill    Cholecalciferol 50 MCG (2000 UT) CAPS Take 1 capsule by mouth daily      diclofenac (VOLTAREN) 75 mg EC tablet take 1 tablet by mouth twice a day 60 tablet 3    DULoxetine (CYMBALTA) 30 mg delayed release capsule 1 in am for 10 days then 2 in am thereafter 50 capsule 5    ELDERBERRY PO Take by mouth      fluticasone (FLONASE) 50 mcg/act nasal spray 1 spray into each nostril daily 9.9 mL 0    hydrOXYzine HCL (ATARAX) 10 mg tablet Take 1 tablet (10 mg total) by mouth every 6 (six) hours as needed for itching 30 tablet 3    methocarbamol (ROBAXIN) 500 mg tablet Take 1 tablet (500 mg total) by mouth 3 (three) times a day 30 tablet 3     No current facility-administered medications for this visit.      Allergies:     No Known Allergies   Physical Exam:     /82   Pulse 84   Temp (!) 97.4 °F (36.3 °C)   Ht 5' 7\" (1.702 m)   Wt 87.3 kg (192 lb 6.4 oz)   SpO2 97%   BMI 30.13 kg/m²     Physical Exam  Vitals and nursing note reviewed.   Constitutional:       General: She is not in acute " distress.     Appearance: Normal appearance. She is not ill-appearing or diaphoretic.   HENT:      Head: Normocephalic and atraumatic.      Right Ear: Tympanic membrane, ear canal and external ear normal.      Left Ear: Tympanic membrane, ear canal and external ear normal.      Nose: Nose normal. No congestion or rhinorrhea.      Mouth/Throat:      Mouth: Mucous membranes are moist.      Pharynx: Oropharynx is clear. No posterior oropharyngeal erythema.   Eyes:      General:         Right eye: No discharge.         Left eye: No discharge.      Extraocular Movements: Extraocular movements intact.      Conjunctiva/sclera: Conjunctivae normal.      Pupils: Pupils are equal, round, and reactive to light.   Neck:      Vascular: No carotid bruit.   Cardiovascular:      Rate and Rhythm: Normal rate and regular rhythm.      Pulses: Normal pulses.      Heart sounds: Normal heart sounds. No murmur heard.  Pulmonary:      Effort: Pulmonary effort is normal. No respiratory distress.      Breath sounds: Normal breath sounds. No wheezing or rhonchi.   Abdominal:      General: Abdomen is flat. Bowel sounds are normal. There is no distension.      Palpations: There is no mass.      Tenderness: There is no abdominal tenderness.   Musculoskeletal:         General: Tenderness present. No swelling or deformity. Normal range of motion.      Cervical back: Normal range of motion and neck supple. No rigidity.      Right lower leg: No edema.      Left lower leg: No edema.      Comments: + increased paravertebral muscle tension of lumbar spinal musculature with impaired rom secondary to pain   Lymphadenopathy:      Cervical: No cervical adenopathy.   Skin:     General: Skin is warm and dry.      Capillary Refill: Capillary refill takes less than 2 seconds.      Coloration: Skin is not jaundiced.      Findings: No bruising, erythema or rash.   Neurological:      General: No focal deficit present.      Mental Status: She is alert and oriented  to person, place, and time.      Cranial Nerves: No cranial nerve deficit.      Sensory: No sensory deficit.      Gait: Gait normal.      Deep Tendon Reflexes: Reflexes normal.   Psychiatric:         Mood and Affect: Mood normal.         Behavior: Behavior normal.         Thought Content: Thought content normal.         Judgment: Judgment normal.          Yola Gerard DO  [facility]

## 2024-04-19 NOTE — TELEPHONE ENCOUNTER
Upon review of the In Basket request we were able to locate, review, and update the patient chart as requested for Pap Smear (HPV) aka Cervical Cancer Screening.    Any additional questions or concerns should be emailed to the Practice Liaisons via the appropriate education email address, please do not reply via In Basket.    Thank you  Amie Umana MA

## 2024-05-07 ENCOUNTER — HOSPITAL ENCOUNTER (OUTPATIENT)
Dept: MRI IMAGING | Facility: HOSPITAL | Age: 54
Discharge: HOME | End: 2024-05-07
Payer: COMMERCIAL

## 2024-05-07 DIAGNOSIS — M54.16 LUMBAR RADICULOPATHY: ICD-10-CM

## 2024-05-07 PROCEDURE — 72148 MRI LUMBAR SPINE W/O DYE: CPT

## 2024-05-14 ENCOUNTER — TELEPHONE (OUTPATIENT)
Dept: FAMILY MEDICINE CLINIC | Facility: CLINIC | Age: 54
End: 2024-05-14

## 2024-05-14 NOTE — TELEPHONE ENCOUNTER
----- Message from Yola Gerard DO sent at 5/14/2024 12:42 PM EDT -----  L4-5 has a bulging disc with pinching of both nerve roots on both sides. I think she should see pain mangement.  She also has small bulging discs at other levels, but I don't think this is the problem.

## 2024-05-16 NOTE — TELEPHONE ENCOUNTER
Patient call back for imaging results, RN reviewed results and patient is requesting to discuss next steps with PCP. She stated she feels the Cymbalta is helping with some of the nerve pain but isn't sure if should wait for pain management or proceed. OV scheduled for 5/21 at 4pm, patient cancelled physical for 5/20 due to conflicting schedule. Patient reported she will call back to reschedule physical.

## 2024-05-21 ENCOUNTER — OFFICE VISIT (OUTPATIENT)
Dept: FAMILY MEDICINE CLINIC | Facility: CLINIC | Age: 54
End: 2024-05-21
Payer: COMMERCIAL

## 2024-05-21 VITALS
TEMPERATURE: 97.3 F | WEIGHT: 191 LBS | DIASTOLIC BLOOD PRESSURE: 76 MMHG | OXYGEN SATURATION: 97 % | SYSTOLIC BLOOD PRESSURE: 110 MMHG | BODY MASS INDEX: 29.98 KG/M2 | HEIGHT: 67 IN | HEART RATE: 101 BPM

## 2024-05-21 DIAGNOSIS — R10.2 PELVIC PAIN: Primary | ICD-10-CM

## 2024-05-21 PROCEDURE — 99213 OFFICE O/P EST LOW 20 MIN: CPT | Performed by: FAMILY MEDICINE

## 2024-05-22 NOTE — PROGRESS NOTES
Patient ID: Marcie Goncalves is a 54 y.o. female.    HPI: 54 y.o.female presents to discuss her MRI findings of her lumbar spine as well as lower pelvic pressure and distention.  She denies any changes in bowels, bladder, any vaginal discharge or breakthrough bleeding, fever or chills.      SUBJECTIVE    Family History   Problem Relation Age of Onset    No Known Problems Mother     No Known Problems Father     No Known Problems Sister     No Known Problems Sister     No Known Problems Daughter     No Known Problems Daughter     Ovarian cancer Maternal Grandmother     No Known Problems Maternal Grandfather     Cancer Paternal Grandmother         bladder    No Known Problems Paternal Grandfather     No Known Problems Brother     No Known Problems Maternal Aunt     No Known Problems Maternal Uncle     No Known Problems Paternal Aunt     No Known Problems Paternal Uncle     ADD / ADHD Neg Hx     Anesthesia problems Neg Hx     Clotting disorder Neg Hx     Collagen disease Neg Hx     Diabetes Neg Hx     Dislocations Neg Hx     Learning disabilities Neg Hx     Neurological problems Neg Hx     Osteoporosis Neg Hx     Rheumatologic disease Neg Hx     Scoliosis Neg Hx     Vascular Disease Neg Hx      Social History     Socioeconomic History    Marital status:      Spouse name: Not on file    Number of children: Not on file    Years of education: Not on file    Highest education level: Not on file   Occupational History    Occupation: Teller   Tobacco Use    Smoking status: Never     Passive exposure: Past    Smokeless tobacco: Never   Vaping Use    Vaping status: Never Used   Substance and Sexual Activity    Alcohol use: Yes    Drug use: No    Sexual activity: Yes     Birth control/protection: OCP   Other Topics Concern    Not on file   Social History Narrative    Most recent tobacco use screenin2019    Do you currently or have you served in the [employer]: No    Were you activated, into active duty, as a  member of the [employer] or as a Reservist: No    Occupation: teller/    General stress level: Medium    Exercise level: Moderate    3-4 times a week    Diet: Regular    Marital status:     Sexual orientation: Heterosexual    Alcohol intake: Moderate    Caffeine intake: Moderate    Illicit drugs: no    Sexually active: Yes     Social Determinants of Health     Financial Resource Strain: Not on file   Food Insecurity: Not on file   Transportation Needs: Not on file   Physical Activity: Not on file   Stress: Not on file   Social Connections: Not on file   Intimate Partner Violence: Not on file   Housing Stability: Not on file     Past Medical History:   Diagnosis Date    Connective tissue disease (HCC)     Heart murmur     History of hepatitis C 5/1/2020    Lumbosacral radiculopathy 5/1/2020    MRSA (methicillin resistant Staphylococcus aureus)     X2    Scoliosis     Vitamin D deficiency 5/1/2020     Past Surgical History:   Procedure Laterality Date    OTHER SURGICAL HISTORY  2014    ovarian tumer removed, benign    OVARIAN CYST REMOVAL  2009     No Known Allergies    Current Outpatient Medications:     Cholecalciferol 50 MCG (2000 UT) CAPS, Take 1 capsule by mouth daily, Disp: , Rfl:     DULoxetine (CYMBALTA) 30 mg delayed release capsule, 1 in am for 10 days then 2 in am thereafter, Disp: 50 capsule, Rfl: 5    ELDERBERRY PO, Take by mouth, Disp: , Rfl:     fluticasone (FLONASE) 50 mcg/act nasal spray, 1 spray into each nostril daily, Disp: 9.9 mL, Rfl: 0    hydrOXYzine HCL (ATARAX) 10 mg tablet, Take 1 tablet (10 mg total) by mouth every 6 (six) hours as needed for itching, Disp: 30 tablet, Rfl: 3    methocarbamol (ROBAXIN) 500 mg tablet, Take 1 tablet (500 mg total) by mouth 3 (three) times a day, Disp: 30 tablet, Rfl: 3    diclofenac (VOLTAREN) 75 mg EC tablet, take 1 tablet by mouth twice a day (Patient not taking: Reported on 5/21/2024), Disp: 60 tablet, Rfl: 3    Review of  "Systems  Constitutional:     Denies fever, chills ,fatigue ,weakness ,weight loss, weight gain     ENT: Denies earache ,loss of hearing ,nosebleed, nasal discharge,nasal congestion ,sore throat ,hoarseness  Pulmonary: Denies shortness of breath ,cough  ,dyspnea on exertion, orthopnea  ,PND   Cardiovascular:  Denies bradycardia , tachycardia  ,palpations, lower extremity edema leg, claudication  Breast:  Denies new or changing breast lumps ,nipple discharge ,nipple changes  Abdomen:  Denies abdominal pain , anorexia , indigestion, nausea, vomiting, constipation, diarrhea+ pelvic pressure and distention  Musculoskeletal: Denies myalgias, arthralgias, joint swelling, joint stiffness , limb pain, limb swelling  Gu: denies dysuria, polyuria  Skin: Denies skin rash, skin lesion, skin wound, itching, dry skin  Neuro: Denies headache, numbness, tingling, confusion, loss of consciousness, dizziness, vertigo  Psychiatric: Denies feelings of depression, suicidal ideation, anxiety, sleep disturbances    OBJECTIVE  /76   Pulse 101   Temp (!) 97.3 °F (36.3 °C)   Ht 5' 7\" (1.702 m)   Wt 86.6 kg (191 lb)   SpO2 97%   BMI 29.91 kg/m²   Constitutional:   NAD, well appearing and well nourished      ENT:   Conjunctiva and lids: no injection, edema, or discharge     Pupils and iris: SABI bilaterally    External inspection of ears and nose: normal without deformities or discharge.      Otoscopic exam: Canals patent without erythema.       Nasal mucosa, septum and turbinates: Normal or edema or discharge         Oropharynx:  Moist mucosa, normal tongue and tonsils without lesions. No erythema        Pulmonary:Respiratory effort normal rate and rhythm, no increased work of breathing. Auscultation of lungs:  Clear bilaterally with no adventitious breath sounds       Cardiovascular: regular rate and rhythm, S1 and S2, no murmur, no edema and/or varicosities of LE      Abdomen: Soft and + distention of lower abdomen with minimal " suprapubic tenderness on palpation .    Positive bowel sounds      No heptomegaly or splenomegaly      Gu: no suprapubic tenderness or CVA tenderness, no urethral discharge  Lymphatic:  No anterior or posterior cervical lymphadenopathy        Musculoskeletal:  Gait and station: Normal gait      Digits and nails normal without clubbing or cyanosis       Inspection/palpation of joints, bones, and muscles:  No joint tenderness, swelling, full active and passive range of motion       Skin: Normal skin turgor and no rashes      Neuro:       Psych:   alert and oriented to person, place and time     normal mood and affect       Assessment/Plan:Diagnoses and all orders for this visit:    Pelvic pain  -     US pelvis complete non OB; Future        Reviewed with patient plan to treat with above plan.    Patient instructed to call in 72 hours if not feeling better or if symptoms worsen

## 2024-05-29 ENCOUNTER — TELEPHONE (OUTPATIENT)
Dept: FAMILY MEDICINE CLINIC | Facility: CLINIC | Age: 54
End: 2024-05-29

## 2024-05-29 ENCOUNTER — CLINICAL SUPPORT (OUTPATIENT)
Dept: FAMILY MEDICINE CLINIC | Facility: CLINIC | Age: 54
End: 2024-05-29
Payer: COMMERCIAL

## 2024-05-29 DIAGNOSIS — R30.0 DYSURIA: Primary | ICD-10-CM

## 2024-05-29 LAB
SL AMB  POCT GLUCOSE, UA: NEGATIVE
SL AMB LEUKOCYTE ESTERASE,UA: ABNORMAL
SL AMB POCT BILIRUBIN,UA: ABNORMAL
SL AMB POCT BLOOD,UA: NEGATIVE
SL AMB POCT CLARITY,UA: CLEAR
SL AMB POCT COLOR,UA: ABNORMAL
SL AMB POCT KETONES,UA: NEGATIVE
SL AMB POCT NITRITE,UA: NEGATIVE
SL AMB POCT PH,UA: 5.5
SL AMB POCT SPECIFIC GRAVITY,UA: 1.03
SL AMB POCT URINE PROTEIN: NEGATIVE
SL AMB POCT UROBILINOGEN: NEGATIVE

## 2024-05-29 PROCEDURE — 87086 URINE CULTURE/COLONY COUNT: CPT | Performed by: FAMILY MEDICINE

## 2024-05-29 PROCEDURE — 81003 URINALYSIS AUTO W/O SCOPE: CPT

## 2024-05-29 NOTE — TELEPHONE ENCOUNTER
Was seen for pelvic pain but had an episode of blood in the urine. She has appt for ultrasound tomorrow. However, her abdomen is bloated (ongoing) since the episode of blood. She said that she would like to rule out UTI. Per Dr. Gerard nurse visit scheduled.

## 2024-05-30 ENCOUNTER — TELEPHONE (OUTPATIENT)
Age: 54
End: 2024-05-30

## 2024-05-30 ENCOUNTER — HOSPITAL ENCOUNTER (OUTPATIENT)
Dept: ULTRASOUND IMAGING | Facility: HOSPITAL | Age: 54
Discharge: HOME | End: 2024-05-30
Attending: FAMILY MEDICINE
Payer: COMMERCIAL

## 2024-05-30 DIAGNOSIS — R10.2 PELVIC PAIN: ICD-10-CM

## 2024-05-30 PROCEDURE — 76856 US EXAM PELVIC COMPLETE: CPT

## 2024-05-30 PROCEDURE — 76830 TRANSVAGINAL US NON-OB: CPT

## 2024-05-30 NOTE — TELEPHONE ENCOUNTER
Pt called for her urine culture results. I let her know they were not in yet. Please call the pt once we have the results as she is having an issue getting into MyChart.

## 2024-05-31 ENCOUNTER — HOSPITAL ENCOUNTER (OUTPATIENT)
Dept: NON INVASIVE DIAGNOSTICS | Facility: HOSPITAL | Age: 54
Discharge: HOME | End: 2024-05-31
Payer: COMMERCIAL

## 2024-05-31 VITALS
DIASTOLIC BLOOD PRESSURE: 80 MMHG | BODY MASS INDEX: 29.98 KG/M2 | WEIGHT: 191 LBS | OXYGEN SATURATION: 98 % | HEIGHT: 67 IN | RESPIRATION RATE: 16 BRPM | SYSTOLIC BLOOD PRESSURE: 130 MMHG | HEART RATE: 87 BPM

## 2024-05-31 DIAGNOSIS — R94.39 ABNORMAL STRESS ECG: Primary | ICD-10-CM

## 2024-05-31 DIAGNOSIS — R00.2 PALPITATIONS: ICD-10-CM

## 2024-05-31 DIAGNOSIS — R01.1 HEART MURMUR: ICD-10-CM

## 2024-05-31 LAB
CHEST PAIN STATEMENT: NORMAL
MAX DIASTOLIC BP: 80 MMHG
MAX HR PERCENT: 97 %
MAX HR: 162 BPM
MAX PREDICTED HEART RATE: 167 BPM
PROTOCOL NAME: NORMAL
SL CV LV EF: 60
SL CV STRESS STAGE REACHED: 4
STRESS ANGINA INDEX: 1
STRESS DUKE TREADMILL SCORE: 0
STRESS POST ESTIMATED WORKLOAD: 10 METS
STRESS POST EXERCISE DUR MIN: 9 MIN
STRESS POST EXERCISE DUR MIN: 9 MIN
STRESS POST EXERCISE DUR SEC: 14 SEC
STRESS POST EXERCISE DUR SEC: 14 SEC
STRESS POST PEAK HR: 162 BPM
STRESS POST PEAK SYSTOLIC BP: 150 MMHG
STRESS ST DEPRESSION: 1 MM
TARGET HR FORMULA: NORMAL
TEST INDICATION: NORMAL

## 2024-05-31 PROCEDURE — 93350 STRESS TTE ONLY: CPT | Performed by: INTERNAL MEDICINE

## 2024-05-31 PROCEDURE — 93350 STRESS TTE ONLY: CPT

## 2024-06-01 LAB — BACTERIA UR CULT: NORMAL

## 2024-06-07 NOTE — TELEPHONE ENCOUNTER
Please call radiology for results from 5/30 ultrasound    Let her know urine culture results are negative for infection

## 2024-06-07 NOTE — TELEPHONE ENCOUNTER
Spoke with radiology, they are going to have it read.     Spoke with patient verbalized understanding.

## 2024-06-07 NOTE — TELEPHONE ENCOUNTER
Patient calling for urine culture results and ultrasound results.  Urine results are in but need to be reviewed by the doctor. Ultrasound results are not in yet. Patient would like a call back once the doctor reviews the urine culture and when ultrasound results are in.

## 2024-06-18 ENCOUNTER — TELEPHONE (OUTPATIENT)
Age: 54
End: 2024-06-18

## 2024-06-18 NOTE — TELEPHONE ENCOUNTER
"Patient called for results. She is unable to access her My chart account. Reviewed result notes with patient . Asking if she should follow up with her Ob-gyn then since still with intermittent pelvic discomfort and bloating.     My Chart message   \"There ws a small cyst on the right ovary but no other abnormal findings\"      "

## 2024-07-09 NOTE — PROGRESS NOTES
Cardiology Consultation     Marcie Goncalves  [id number]  1970  HEART & VASCULAR [facility]  [address] [place] [address] [place] [address]  1. Heart murmur  POCT ECG    Echo complete w/ contrast if indicated      2. Heart disorder  POCT ECG      3. Abnormal stress ECG  Ambulatory Referral to Cardiology    POCT ECG    Echo complete w/ contrast if indicated        Patient Active Problem List   Diagnosis   • Rib contusion, right, initial encounter   • Heart murmur   • Connective tissue disease (HCC)   • MRSA (methicillin resistant Staphylococcus aureus)   • Scoliosis   • Lumbosacral radiculopathy   • History of hepatitis C   • Vitamin D deficiency   • Lab test positive for detection of COVID-19 virus   • Left flank pain   • Left hip pain   • Jaw pain, non-TMJ   • Allergic rhinitis   • Fatty liver   • Folliculitis   • Heart disorder   • Hidradenitis suppurativa   • Lung nodule seen on imaging study   • Onychomycosis   • Pes planus   • Right patellofemoral syndrome   • Swelling of both ankles   • Systemic involvement of connective tissue, unspecified (HCC)       HPI patient is here for cardiology evaluation.  Patient has scleroderma .  She had a stress echo 5/31/2024. Patient had an abnormal ST segment response to exercise with nonlimiting chest discomfort.  The echocardiogram showed no evidence of stress-induced ischemic wall motion abnormality.  She exercised 9 minutes and 14 seconds.  ST segment changes resolved within a minute of recovery.  Resting echo demonstrated preserved LV systolic function with LVEF of 60% and mild TR.  Test was ordered April 2024 in the setting of patient having chest pain and palpitation with exertion.  There was no significant aortic or mitral valve disease. She is a nonsmoker. EKG today with NSR with  NSST's. Her daughter has Ebstein's anomaly and had ablation for WPW/SVT. Patient had some palps in the past. She  is stable now.    PMH-  Past Medical History:   Diagnosis Date   • Connective tissue disease (HCC)    • Heart murmur    • History of hepatitis C 2020   • Lumbosacral radiculopathy 2020   • MRSA (methicillin resistant Staphylococcus aureus)     X2   • Scoliosis    • Vitamin D deficiency 2020        SOCIAL HISTORY-  Social History     Socioeconomic History   • Marital status:      Spouse name: Not on file   • Number of children: Not on file   • Years of education: Not on file   • Highest education level: Not on file   Occupational History   • Occupation: Teller   Tobacco Use   • Smoking status: Never     Passive exposure: Past   • Smokeless tobacco: Never   Vaping Use   • Vaping status: Never Used   Substance and Sexual Activity   • Alcohol use: Yes   • Drug use: No   • Sexual activity: Yes     Birth control/protection: OCP   Other Topics Concern   • Not on file   Social History Narrative    Most recent tobacco use screenin2019    Do you currently or have you served in the [employer]: No    Were you activated, into active duty, as a member of the [employer] or as a Reservist: No    Occupation: teller/    General stress level: Medium    Exercise level: Moderate    3-4 times a week    Diet: Regular    Marital status:     Sexual orientation: Heterosexual    Alcohol intake: Moderate    Caffeine intake: Moderate    Illicit drugs: no    Sexually active: Yes     Social Determinants of Health     Financial Resource Strain: Not on file   Food Insecurity: Not on file   Transportation Needs: Not on file   Physical Activity: Not on file   Stress: Not on file   Social Connections: Not on file   Intimate Partner Violence: Not on file   Housing Stability: Not on file        FAMILY HISTORY-  Family History   Problem Relation Age of Onset   • No Known Problems Mother    • No Known Problems Father    • No Known Problems Sister    • No Known Problems Sister    • No Known Problems  Daughter    • No Known Problems Daughter    • Ovarian cancer Maternal Grandmother    • No Known Problems Maternal Grandfather    • Cancer Paternal Grandmother         bladder   • No Known Problems Paternal Grandfather    • No Known Problems Brother    • No Known Problems Maternal Aunt    • No Known Problems Maternal Uncle    • No Known Problems Paternal Aunt    • No Known Problems Paternal Uncle    • ADD / ADHD Neg Hx    • Anesthesia problems Neg Hx    • Clotting disorder Neg Hx    • Collagen disease Neg Hx    • Diabetes Neg Hx    • Dislocations Neg Hx    • Learning disabilities Neg Hx    • Neurological problems Neg Hx    • Osteoporosis Neg Hx    • Rheumatologic disease Neg Hx    • Scoliosis Neg Hx    • Vascular Disease Neg Hx        SURGICAL HISTORY-  Past Surgical History:   Procedure Laterality Date   • OTHER SURGICAL HISTORY  2014    ovarian tumer removed, benign   • OVARIAN CYST REMOVAL  2009         Current Outpatient Medications:   •  Cholecalciferol (Vitamin D3 Super Strength) 50 MCG (2000 UT) TABS, Take by mouth, Disp: , Rfl:   •  diclofenac (VOLTAREN) 75 mg EC tablet, take 1 tablet by mouth twice a day (Patient taking differently: if needed), Disp: 60 tablet, Rfl: 3  •  DULoxetine (CYMBALTA) 30 mg delayed release capsule, 1 in am for 10 days then 2 in am thereafter, Disp: 50 capsule, Rfl: 5  •  ELDERBERRY PO, Take by mouth, Disp: , Rfl:   •  fluticasone (FLONASE) 50 mcg/act nasal spray, 1 spray into each nostril daily, Disp: 9.9 mL, Rfl: 0  •  hydrOXYzine HCL (ATARAX) 10 mg tablet, Take 1 tablet (10 mg total) by mouth every 6 (six) hours as needed for itching, Disp: 30 tablet, Rfl: 3  •  Cholecalciferol 50 MCG (2000 UT) CAPS, Take 1 capsule by mouth daily (Patient not taking: Reported on 7/19/2024), Disp: , Rfl:   •  methocarbamol (ROBAXIN) 500 mg tablet, Take 1 tablet (500 mg total) by mouth 3 (three) times a day (Patient not taking: Reported on 7/19/2024), Disp: 30 tablet, Rfl: 3  No Known  "Allergies  Vitals:    07/19/24 1354   BP: 118/78   BP Location: Left arm   Patient Position: Sitting   Pulse: 75   SpO2: 97%   Weight: 85.3 kg (188 lb 1.6 oz)   Height: 5' 7\" (1.702 m)         Review of Systems:  Review of Systems   All other systems reviewed and are negative.      Physical Exam:  Physical Exam  Vitals reviewed.   Constitutional:       Appearance: She is well-developed.   HENT:      Head: Normocephalic and atraumatic.   Eyes:      Conjunctiva/sclera: Conjunctivae normal.      Pupils: Pupils are equal, round, and reactive to light.   Cardiovascular:      Rate and Rhythm: Normal rate.      Heart sounds: Normal heart sounds.   Pulmonary:      Effort: Pulmonary effort is normal.      Breath sounds: Normal breath sounds.   Musculoskeletal:      Cervical back: Normal range of motion and neck supple.   Skin:     General: Skin is warm and dry.   Neurological:      Mental Status: She is alert and oriented to person, place, and time.         Discussion/Summary:I will continue the patient's present medical regimen.  The patient appears well compensated.  I have asked the patient to call if there is a problem in the interim otherwise I will see the patient in one years time. Patient is due for an echocardiogram prior to the next visit to assess LV wall thickness and systolic function and assess TV.  "

## 2024-07-19 ENCOUNTER — CONSULT (OUTPATIENT)
Dept: CARDIOLOGY CLINIC | Facility: CLINIC | Age: 54
End: 2024-07-19
Payer: COMMERCIAL

## 2024-07-19 VITALS
HEART RATE: 75 BPM | HEIGHT: 67 IN | DIASTOLIC BLOOD PRESSURE: 78 MMHG | BODY MASS INDEX: 29.52 KG/M2 | WEIGHT: 188.1 LBS | OXYGEN SATURATION: 97 % | SYSTOLIC BLOOD PRESSURE: 118 MMHG

## 2024-07-19 DIAGNOSIS — R01.1 HEART MURMUR: Primary | ICD-10-CM

## 2024-07-19 DIAGNOSIS — R94.39 ABNORMAL STRESS ECG: ICD-10-CM

## 2024-07-19 DIAGNOSIS — I51.9 HEART DISORDER: ICD-10-CM

## 2024-07-19 PROCEDURE — 99204 OFFICE O/P NEW MOD 45 MIN: CPT | Performed by: INTERNAL MEDICINE

## 2024-07-19 PROCEDURE — 93000 ELECTROCARDIOGRAM COMPLETE: CPT | Performed by: INTERNAL MEDICINE

## 2024-07-19 RX ORDER — CHOLECALCIFEROL (VITAMIN D3) 50 MCG
TABLET ORAL
COMMUNITY

## 2024-09-06 ENCOUNTER — HOSPITAL ENCOUNTER (OUTPATIENT)
Dept: NON INVASIVE DIAGNOSTICS | Facility: CLINIC | Age: 54
Discharge: HOME | End: 2024-09-06
Payer: COMMERCIAL

## 2024-09-06 VITALS
HEART RATE: 75 BPM | WEIGHT: 188 LBS | DIASTOLIC BLOOD PRESSURE: 78 MMHG | SYSTOLIC BLOOD PRESSURE: 118 MMHG | HEIGHT: 67 IN | BODY MASS INDEX: 29.51 KG/M2

## 2024-09-06 DIAGNOSIS — R01.1 HEART MURMUR: ICD-10-CM

## 2024-09-06 DIAGNOSIS — R94.39 ABNORMAL STRESS ECG: ICD-10-CM

## 2024-09-06 LAB
AORTIC ROOT: 3.3 CM
APICAL FOUR CHAMBER EJECTION FRACTION: 66 %
ASCENDING AORTA: 3.6 CM
BSA FOR ECHO PROCEDURE: 1.97 M2
E WAVE DECELERATION TIME: 182 MS
E/A RATIO: 1.12
FRACTIONAL SHORTENING: 40 (ref 28–44)
INTERVENTRICULAR SEPTUM IN DIASTOLE (PARASTERNAL SHORT AXIS VIEW): 0.9 CM
INTERVENTRICULAR SEPTUM: 0.9 CM (ref 0.6–1.1)
LAAS-AP2: 21.4 CM2
LAAS-AP4: 20.5 CM2
LEFT ATRIUM SIZE: 3.8 CM
LEFT ATRIUM VOLUME (MOD BIPLANE): 64 ML
LEFT ATRIUM VOLUME INDEX (MOD BIPLANE): 32.5 ML/M2
LEFT INTERNAL DIMENSION IN SYSTOLE: 2.9 CM (ref 2.1–4)
LEFT VENTRICULAR INTERNAL DIMENSION IN DIASTOLE: 4.8 CM (ref 3.5–6)
LEFT VENTRICULAR POSTERIOR WALL IN END DIASTOLE: 1 CM
LEFT VENTRICULAR STROKE VOLUME: 75 ML
LVSV (TEICH): 75 ML
MV E'TISSUE VEL-LAT: 11 CM/S
MV E'TISSUE VEL-SEP: 9 CM/S
MV PEAK A VEL: 0.69 M/S
MV PEAK E VEL: 77 CM/S
MV STENOSIS PRESSURE HALF TIME: 53 MS
MV VALVE AREA P 1/2 METHOD: 4.2
RA PRESSURE ESTIMATED: 3 MMHG
RIGHT ATRIUM AREA SYSTOLE A4C: 13.2 CM2
RIGHT VENTRICLE ID DIMENSION: 3.1 CM
RV PSP: 25 MMHG
SL CV LEFT ATRIUM LENGTH A2C: 5.6 CM
SL CV LV EF: 60
SL CV PED ECHO LEFT VENTRICLE DIASTOLIC VOLUME (MOD BIPLANE) 2D: 107 ML
SL CV PED ECHO LEFT VENTRICLE SYSTOLIC VOLUME (MOD BIPLANE) 2D: 32 ML
TR MAX PG: 22 MMHG
TR PEAK VELOCITY: 2.4 M/S
TRICUSPID ANNULAR PLANE SYSTOLIC EXCURSION: 2.2 CM
TRICUSPID VALVE PEAK REGURGITATION VELOCITY: 2.36 M/S

## 2024-09-06 PROCEDURE — 93306 TTE W/DOPPLER COMPLETE: CPT | Performed by: INTERNAL MEDICINE

## 2024-09-06 PROCEDURE — 93306 TTE W/DOPPLER COMPLETE: CPT

## 2024-12-18 ENCOUNTER — APPOINTMENT (OUTPATIENT)
Dept: RADIOLOGY | Facility: CLINIC | Age: 54
End: 2024-12-18
Payer: COMMERCIAL

## 2024-12-18 ENCOUNTER — OFFICE VISIT (OUTPATIENT)
Dept: URGENT CARE | Facility: CLINIC | Age: 54
End: 2024-12-18
Payer: COMMERCIAL

## 2024-12-18 VITALS
DIASTOLIC BLOOD PRESSURE: 75 MMHG | WEIGHT: 188.4 LBS | OXYGEN SATURATION: 97 % | BODY MASS INDEX: 29.51 KG/M2 | TEMPERATURE: 97.3 F | HEART RATE: 91 BPM | SYSTOLIC BLOOD PRESSURE: 114 MMHG | RESPIRATION RATE: 16 BRPM

## 2024-12-18 DIAGNOSIS — S93.402A SPRAIN OF LEFT ANKLE, UNSPECIFIED LIGAMENT, INITIAL ENCOUNTER: Primary | ICD-10-CM

## 2024-12-18 DIAGNOSIS — S99.912A ANKLE INJURY, LEFT, INITIAL ENCOUNTER: ICD-10-CM

## 2024-12-18 PROCEDURE — 99214 OFFICE O/P EST MOD 30 MIN: CPT

## 2024-12-18 PROCEDURE — 73610 X-RAY EXAM OF ANKLE: CPT

## 2024-12-18 NOTE — PROGRESS NOTES
[facility]        NAME: Marcie Goncalves is a 54 y.o. female  : 1970    MRN: [id number]  DATE: 2024  TIME: 5:45 PM    Assessment and Plan   Sprain of left ankle, unspecified ligament, initial encounter [S93.402A]  1. Sprain of left ankle, unspecified ligament, initial encounter  XR ankle 3+ vw left        XR L ankle- No acute osseous abnormality noted. Pending final radiology read.     Likely sprained. Ankle brace applied in office with noted improvement in symptoms. Supportive management.     Patient Instructions     Rest injured extremity  Gentle stretching as tolerated  Heat or ice to site of injury as directed  20 minutes at a time with breaks in between   Ice for the first 2-3 days, heat after unless continued swelling noted  Ibuprofen and Tylenol for pain as needed     Follow up with PCP in 3-5 days   Proceed to ER if worsening symptoms     If tests are performed, our office will contact you with results only if changes need to made to the care plan discussed with you at the visit. [facility].    Chief Complaint     Chief Complaint   Patient presents with    Ankle Injury     Reports twisting left ankle yesterday. Now having pain, decreased mobility. Using ice, elevation and advil.          History of Present Illness       Ankle Injury   The incident occurred 12 to 24 hours ago. The incident occurred at home. The injury mechanism was an inversion injury. The pain is present in the left ankle. The quality of the pain is described as aching and shooting. The pain is moderate. The pain has been Worsening since onset. Pertinent negatives include no inability to bear weight, loss of sensation, muscle weakness, numbness or tingling. Associated symptoms comments: swelling. She reports no foreign bodies present. The symptoms are aggravated by palpation and weight bearing. She has tried NSAIDs, ice and elevation for the symptoms. The treatment  provided mild relief.       Review of Systems   Review of Systems   Constitutional:  Negative for chills and fever.   HENT:  Negative for congestion, postnasal drip, rhinorrhea, sinus pressure, sore throat and trouble swallowing.    Respiratory:  Negative for cough, chest tightness and shortness of breath.    Cardiovascular:  Negative for chest pain and palpitations.   Gastrointestinal:  Negative for abdominal pain, nausea and vomiting.   Genitourinary:  Negative for difficulty urinating.   Musculoskeletal:  Positive for arthralgias (left ankle pain) and gait problem (due to pain). Negative for myalgias.   Neurological:  Negative for dizziness, tingling, numbness and headaches.         Current Medications       Current Outpatient Medications:     Cholecalciferol (Vitamin D3 Super Strength) 50 MCG (2000 UT) TABS, Take by mouth, Disp: , Rfl:     diclofenac (VOLTAREN) 75 mg EC tablet, take 1 tablet by mouth twice a day, Disp: 60 tablet, Rfl: 3    ELDERBERRY PO, Take by mouth, Disp: , Rfl:     fluticasone (FLONASE) 50 mcg/act nasal spray, 1 spray into each nostril daily, Disp: 9.9 mL, Rfl: 0    hydrOXYzine HCL (ATARAX) 10 mg tablet, Take 1 tablet (10 mg total) by mouth every 6 (six) hours as needed for itching, Disp: 30 tablet, Rfl: 3    methocarbamol (ROBAXIN) 500 mg tablet, Take 1 tablet (500 mg total) by mouth 3 (three) times a day, Disp: 30 tablet, Rfl: 3    Cholecalciferol 50 MCG (2000 UT) CAPS, Take 1 capsule by mouth daily (Patient not taking: Reported on 7/19/2024), Disp: , Rfl:     DULoxetine (CYMBALTA) 30 mg delayed release capsule, 1 in am for 10 days then 2 in am thereafter (Patient not taking: Reported on 12/18/2024), Disp: 50 capsule, Rfl: 5    Current Allergies     Allergies as of 12/18/2024    (No Known Allergies)            The following portions of the patient's history were reviewed and updated as appropriate: allergies, current medications, past family history, past medical history, past social  history, past surgical history and problem list.     Past Medical History:   Diagnosis Date    Connective tissue disease (HCC)     Heart murmur     History of hepatitis C 5/1/2020    Lumbosacral radiculopathy 5/1/2020    MRSA (methicillin resistant Staphylococcus aureus)     X2    Scoliosis     Vitamin D deficiency 5/1/2020       Past Surgical History:   Procedure Laterality Date    OTHER SURGICAL HISTORY  2014    ovarian tumer removed, benign    OVARIAN CYST REMOVAL  2009       Family History   Problem Relation Age of Onset    No Known Problems Mother     No Known Problems Father     No Known Problems Sister     No Known Problems Sister     No Known Problems Daughter     No Known Problems Daughter     Ovarian cancer Maternal Grandmother     No Known Problems Maternal Grandfather     Cancer Paternal Grandmother         bladder    No Known Problems Paternal Grandfather     No Known Problems Brother     No Known Problems Maternal Aunt     No Known Problems Maternal Uncle     No Known Problems Paternal Aunt     No Known Problems Paternal Uncle     ADD / ADHD Neg Hx     Anesthesia problems Neg Hx     Clotting disorder Neg Hx     Collagen disease Neg Hx     Diabetes Neg Hx     Dislocations Neg Hx     Learning disabilities Neg Hx     Neurological problems Neg Hx     Osteoporosis Neg Hx     Rheumatologic disease Neg Hx     Scoliosis Neg Hx     Vascular Disease Neg Hx          Medications have been verified.        Objective   /75   Pulse 91   Temp (!) 97.3 °F (36.3 °C) (Tympanic)   Resp 16   Wt 85.5 kg (188 lb 6.4 oz)   SpO2 97%   BMI 29.51 kg/m²        Physical Exam     Physical Exam  Constitutional:       General: She is not in acute distress.  HENT:      Head: Normocephalic.      Nose: Nose normal.   Eyes:      Pupils: Pupils are equal, round, and reactive to light.   Cardiovascular:      Rate and Rhythm: Normal rate and regular rhythm.      Pulses: Normal pulses.      Heart sounds: Normal heart sounds.    Pulmonary:      Effort: Pulmonary effort is normal.      Breath sounds: Normal breath sounds.   Abdominal:      General: Abdomen is flat.   Musculoskeletal:      Right ankle: Normal.      Left ankle: Swelling and ecchymosis present. No deformity or lacerations. Tenderness present over the lateral malleolus and ATF ligament. No medial malleolus tenderness. Normal pulse.   Skin:     General: Skin is warm and dry.      Capillary Refill: Capillary refill takes less than 2 seconds.   Neurological:      Mental Status: She is alert and oriented to person, place, and time.

## 2025-01-10 ENCOUNTER — OFFICE VISIT (OUTPATIENT)
Dept: OBGYN CLINIC | Facility: CLINIC | Age: 55
End: 2025-01-10
Payer: COMMERCIAL

## 2025-01-10 ENCOUNTER — APPOINTMENT (OUTPATIENT)
Dept: RADIOLOGY | Age: 55
End: 2025-01-10
Payer: COMMERCIAL

## 2025-01-10 VITALS — WEIGHT: 188 LBS | HEIGHT: 67 IN | BODY MASS INDEX: 29.51 KG/M2

## 2025-01-10 DIAGNOSIS — M25.572 PAIN, JOINT, ANKLE AND FOOT, LEFT: ICD-10-CM

## 2025-01-10 DIAGNOSIS — S82.62XA CLOSED AVULSION FRACTURE OF LATERAL MALLEOLUS OF LEFT FIBULA, INITIAL ENCOUNTER: Primary | ICD-10-CM

## 2025-01-10 PROCEDURE — 99204 OFFICE O/P NEW MOD 45 MIN: CPT | Performed by: ORTHOPAEDIC SURGERY

## 2025-01-10 PROCEDURE — 73610 X-RAY EXAM OF ANKLE: CPT

## 2025-01-10 NOTE — LETTER
January 10, 2025     Patient: Marcie Goncalves  YOB: 1970  Date of Visit: 1/10/2025      To Whom it May Concern:    Marcie Goncalves is under my professional care. Marcie was seen in my office on 1/10/2025. Marcie should be allowed to use the CAM boot through airport security/airport.    If you have any questions or concerns, please don't hesitate to call.         Sincerely,          Justin Torres MD        CC: No Recipients

## 2025-01-10 NOTE — PROGRESS NOTES
CHIEF COMPLAINT   left  ankle injury    HISTORY OF PRESENT ILLNESS  Marcie Goncalves is a 54 y.o. female who presents for evaluation of their left ankle.  Patient was seen at urgent care on December 18, 2024 after she rolled her left ankle.  She was given an ankle brace and discharged from urgent care.  Patient comes in today for further evaluation of the left ankle.  Patient said that on December 16, 2024 she rolled her ankle while walking her dog.  She said since then she has tried an Ace bandage and [place] brace which have not helped much.  She said she did try crutches for a week which did help.  There is a pain around the lateral malleolus.    REVIEW OF SYSTEMS  Review of systems was performed and, outside that mentioned in the HPI, it was negative for symptomology related to the integumentary, hematologic, immunologic, allergic, neurologic, cardiovascular, respiratory, GI or  systems.    MEDICAL HISTORY  Patient Active Problem List   Diagnosis    Rib contusion, right, initial encounter    Heart murmur    Connective tissue disease (HCC)    MRSA (methicillin resistant Staphylococcus aureus)    Scoliosis    Lumbosacral radiculopathy    History of hepatitis C    Vitamin D deficiency    Lab test positive for detection of COVID-19 virus    Left flank pain    Left hip pain    Jaw pain, non-TMJ    Allergic rhinitis    Fatty liver    Folliculitis    Heart disorder    Hidradenitis suppurativa    Lung nodule seen on imaging study    Onychomycosis    Pes planus    Right patellofemoral syndrome    Swelling of both ankles    Systemic involvement of connective tissue, unspecified (HCC)    Closed avulsion fracture of lateral malleolus of left fibula, initial encounter       SURGICAL HISTORY  Past Surgical History:   Procedure Laterality Date    OTHER SURGICAL HISTORY  2014    ovarian tumer removed, benign    OVARIAN CYST REMOVAL  2009       CURRENT MEDICATIONS    Current Outpatient Medications:     Cholecalciferol (Vitamin  D3 Super Strength) 50 MCG (2000 UT) TABS, Take by mouth, Disp: , Rfl:     diclofenac (VOLTAREN) 75 mg EC tablet, take 1 tablet by mouth twice a day, Disp: 60 tablet, Rfl: 3    ELDERBERRY PO, Take by mouth, Disp: , Rfl:     fluticasone (FLONASE) 50 mcg/act nasal spray, 1 spray into each nostril daily, Disp: 9.9 mL, Rfl: 0    hydrOXYzine HCL (ATARAX) 10 mg tablet, Take 1 tablet (10 mg total) by mouth every 6 (six) hours as needed for itching, Disp: 30 tablet, Rfl: 3    Cholecalciferol 50 MCG (2000 UT) CAPS, Take 1 capsule by mouth daily (Patient not taking: Reported on 2024), Disp: , Rfl:     DULoxetine (CYMBALTA) 30 mg delayed release capsule, 1 in am for 10 days then 2 in am thereafter (Patient not taking: Reported on 2024), Disp: 50 capsule, Rfl: 5    methocarbamol (ROBAXIN) 500 mg tablet, Take 1 tablet (500 mg total) by mouth 3 (three) times a day (Patient not taking: Reported on 1/10/2025), Disp: 30 tablet, Rfl: 3    SOCIAL HISTORY  Social History     Socioeconomic History    Marital status:      Spouse name: Not on file    Number of children: Not on file    Years of education: Not on file    Highest education level: Not on file   Occupational History    Occupation: Teller   Tobacco Use    Smoking status: Never     Passive exposure: Past    Smokeless tobacco: Never   Vaping Use    Vaping status: Never Used   Substance and Sexual Activity    Alcohol use: Yes    Drug use: No    Sexual activity: Yes     Birth control/protection: OCP   Other Topics Concern    Not on file   Social History Narrative    Most recent tobacco use screenin2019    Do you currently or have you served in the  Armed Forces: No    Were you activated, into active duty, as a member of the [employer] or as a Reservist: No    Occupation: teller/    General stress level: Medium    Exercise level: Moderate    3-4 times a week    Diet: Regular    Marital status:     Sexual orientation: Heterosexual     "Alcohol intake: Moderate    Caffeine intake: Moderate    Illicit drugs: no    Sexually active: Yes     Social Drivers of Health     Financial Resource Strain: Not on file   Food Insecurity: Not on file   Transportation Needs: Not on file   Physical Activity: Not on file   Stress: Not on file   Social Connections: Not on file   Intimate Partner Violence: Not on file   Housing Stability: Not on file       Objective   VITAL SIGNS  Ht 5' 7\" (1.702 m) Comment: verbal  Wt 85.3 kg (188 lb)   BMI 29.44 kg/m²     PHYSICAL EXAMINATION  Musculoskeletal: Left Ankle   Skin Intact               Swelling/ecchymosis over lateral malleolus              TTP: Lateral malleolus   Range of motion and strength testing limited due to pain   Sensation intact throughout Superficial peroneal, Deep peroneal, Tibial, Sural, Saphenous distributions              EHL/TA/PF motor function intact to testing.               BCR              Gait: Antalgic     DIAGNOSTICS  Independent interpretation of the left ankle demonstrates small avulsion fracture of the lateral malleolus    Procedure: XR ankle 3+ vw left  Result Date: 12/19/2024  Narrative: XR ANKLE 3+ VW LEFT INDICATION: S99.912A: Unspecified injury of left ankle, initial encounter. COMPARISON: None FINDINGS: No acute fracture or dislocation. There is a plantar calcaneal spur. No lytic or blastic osseous lesion. Mild soft tissue swelling overlies the lateral malleolus.     Impression: No acute osseous abnormality. Computerized Assisted Algorithm (CAA) may have been used to analyze all applicable images. Workstation performed: [id number]       Assessment & Plan    Left ankle injury   2. Possible sprain vs nondisplaced fracture    We had a lengthy discussion regarding the diagnosis, natural history, and treatment options of the patients avulsion fracture. We discussed standard of care for treatment, goals and expectations. Treatment plan discussed with patient that includes immobilization in a " CAM boot WBAT with crutches/walker for assistance. They are to wear boot for a total of 4-6 weeks. Return to clinic prn.    If any issues, questions, or concerns arise between now and the next appointment, we have encouraged the patient contact our team.    Patient voiced their understanding of this plan, and they were in agreement with it. All questions answered.      Scribe Attestation      I,:  Cortez Madrigal PA-C am acting as a scribe while in the presence of the attending physician.:       I,:  Justin Torres MD personally performed the services described in this documentation    as scribed in my presence.:

## 2025-01-15 ENCOUNTER — TELEPHONE (OUTPATIENT)
Age: 55
End: 2025-01-15

## 2025-01-15 NOTE — TELEPHONE ENCOUNTER
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 Since the swelling has gone down she is having a lot of pain. Before she applies at work for disability she wanted to know if you would take her out of work for a couple weeks for the ankle to heal    Call back#: [phone]

## 2025-01-16 ENCOUNTER — TELEPHONE (OUTPATIENT)
Age: 55
End: 2025-01-16

## 2025-01-16 NOTE — TELEPHONE ENCOUNTER
Called and left voicemail for pt and relayed ANSELMO Rajput message, advised to call back with any questions.

## 2025-01-16 NOTE — TELEPHONE ENCOUNTER
Caller: Marcie    Reason for call: Patient called and advised letter was done. Will be dropping off forms as soon as possible and will  letter.    She stated she will not get paid till forms are completed, thankful for the 6 weeks extra recovery time. I advised forms take 10-14 business days not including weekends or Holidays. Patient will try to get forms in ASAP.     Call back#: [phone]

## 2025-02-03 ENCOUNTER — TELEPHONE (OUTPATIENT)
Dept: UROLOGY | Facility: CLINIC | Age: 55
End: 2025-02-03

## 2025-02-04 ENCOUNTER — TELEPHONE (OUTPATIENT)
Dept: FAMILY MEDICINE CLINIC | Facility: CLINIC | Age: 55
End: 2025-02-04

## 2025-02-04 NOTE — TELEPHONE ENCOUNTER
LM for pt- is not due for a physical and is scheduled with Dr. Mendes tomorrow. Pt is due for physical in April.

## 2025-02-10 ENCOUNTER — OFFICE VISIT (OUTPATIENT)
Dept: UROLOGY | Facility: CLINIC | Age: 55
End: 2025-02-10
Payer: COMMERCIAL

## 2025-02-10 VITALS
WEIGHT: 188.2 LBS | SYSTOLIC BLOOD PRESSURE: 116 MMHG | BODY MASS INDEX: 29.54 KG/M2 | DIASTOLIC BLOOD PRESSURE: 82 MMHG | OXYGEN SATURATION: 98 % | HEIGHT: 67 IN | HEART RATE: 93 BPM

## 2025-02-10 DIAGNOSIS — R31.0 GROSS HEMATURIA: ICD-10-CM

## 2025-02-10 DIAGNOSIS — R10.2 PELVIC AND PERINEAL PAIN: Primary | ICD-10-CM

## 2025-02-10 PROCEDURE — 99204 OFFICE O/P NEW MOD 45 MIN: CPT | Performed by: UROLOGY

## 2025-02-10 RX ORDER — ESTRADIOL 0.1 MG/G
0.3 CREAM VAGINAL 3 TIMES WEEKLY
Qty: 42.5 G | Refills: 6 | Status: SHIPPED | OUTPATIENT
Start: 2025-02-10

## 2025-02-10 NOTE — PROGRESS NOTES
Referring Physician: Yola Gerard DO  A copy of this note was sent to the referring physician.       Diagnoses and all orders for this visit:    Pelvic and perineal pain  -     CT abdomen pelvis w wo contrast; Future  -     Creatinine, serum; Future  -     Cytology, urine; Future  -     estradiol (ESTRACE VAGINAL) 0.1 mg/g vaginal cream; Insert 0.3 g into the vagina 3 (three) times a week Apply a pea-sized amount every Monday Wednesday Friday at bedtime    Gross hematuria  -     CT abdomen pelvis w wo contrast; Future  -     Creatinine, serum; Future  -     Cytology, urine; Future            Assessment and plan:       Recurrent pyelonephritis    2. 1 episode of gross hematuria w UTI in summer  - followup UA negative    3. History of pediatric urethral (or potentially) ureteral surgery    4. Pelvic pain    I recommended flexible cystoscopy for further evaluation.  Discussed the risks benefits and alternatives to this diagnostic procedure.  Risks include but are not limited to hematuria, pain, urinary tract infection, stricture formation, possible need for hospitalization.  Patient verbalized understanding and has elected to proceed.  Cystoscopy will be scheduled in the near future.    CT urogram ordered to evaluate the upper tract given the episode of hematuria and her history of prior ureteral procedure (query pediatric history of vesicoureteral reflux).    Also prescribed topical estrogen placement cream for her irritative voiding symptoms.    Nick Perrin MD      Chief Complaint     Referral for gross hematuria and pelvic pain      History of Present Illness     Marcie Goncalves is a 54 y.o. presents for evaluation of gross hematuria and pelvic pain    Detailed Urologic History     - please refer to HPI    Review of Systems     Review of Systems   Constitutional: Negative for activity change and fatigue.   HENT: Negative for congestion.    Eyes: Negative for visual disturbance.   Respiratory:  "Negative for shortness of breath and wheezing.    Cardiovascular: Negative for chest pain and leg swelling.   Gastrointestinal: Negative for abdominal pain.   Endocrine: Negative for polyuria.   Genitourinary: Negative for dysuria, flank pain, hematuria and urgency.   Musculoskeletal: Negative for back pain.   Allergic/Immunologic: Negative for immunocompromised state.   Neurological: Negative for dizziness and numbness.   Psychiatric/Behavioral: Negative for dysphoric mood.   All other systems reviewed and are negative.          Allergies     No Known Allergies    Physical Exam       Physical Exam  Constitutional:       General: He is not in acute distress.     Appearance: He is well-developed.   HENT:      Head: Normocephalic and atraumatic.   Cardiovascular:      Comments: Negative lower extremity edema  Pulmonary:      Effort: Pulmonary effort is normal.      Breath sounds: Normal breath sounds.   Abdominal:      Palpations: Abdomen is soft.   Musculoskeletal:         General: Normal range of motion.      Cervical back: Normal range of motion.   Skin:     General: Skin is warm.   Neurological:      Mental Status: He is alert and oriented to person, place, and time.   Psychiatric:         Behavior: Behavior normal.           Vital Signs  Vitals:    02/10/25 1559   BP: 116/82   BP Location: Left arm   Patient Position: Sitting   Cuff Size: Large   Pulse: 93   SpO2: 98%   Weight: 85.4 kg (188 lb 3.2 oz)   Height: 5' 7\" (1.702 m)         Current Medications       Current Outpatient Medications:     Cholecalciferol (Vitamin D3 Super Strength) 50 MCG (2000 UT) TABS, Take by mouth, Disp: , Rfl:     diclofenac (VOLTAREN) 75 mg EC tablet, take 1 tablet by mouth twice a day (Patient taking differently: Take 75 mg by mouth as needed), Disp: 60 tablet, Rfl: 3    ELDERBERRY PO, Take by mouth, Disp: , Rfl:     fluticasone (FLONASE) 50 mcg/act nasal spray, 1 spray into each nostril daily, Disp: 9.9 mL, Rfl: 0    hydrOXYzine " HCL (ATARAX) 10 mg tablet, Take 1 tablet (10 mg total) by mouth every 6 (six) hours as needed for itching, Disp: 30 tablet, Rfl: 3    methocarbamol (ROBAXIN) 500 mg tablet, Take 1 tablet (500 mg total) by mouth 3 (three) times a day, Disp: 30 tablet, Rfl: 3    Cholecalciferol 50 MCG (2000 UT) CAPS, Take 1 capsule by mouth daily (Patient not taking: Reported on 7/19/2024), Disp: , Rfl:     DULoxetine (CYMBALTA) 30 mg delayed release capsule, 1 in am for 10 days then 2 in am thereafter (Patient not taking: Reported on 2/10/2025), Disp: 50 capsule, Rfl: 5      Active Problems     Patient Active Problem List   Diagnosis    Rib contusion, right, initial encounter    Heart murmur    Connective tissue disease (HCC)    MRSA (methicillin resistant Staphylococcus aureus)    Scoliosis    Lumbosacral radiculopathy    History of hepatitis C    Vitamin D deficiency    Lab test positive for detection of COVID-19 virus    Left flank pain    Left hip pain    Jaw pain, non-TMJ    Allergic rhinitis    Fatty liver    Folliculitis    Heart disorder    Hidradenitis suppurativa    Lung nodule seen on imaging study    Onychomycosis    Pes planus    Right patellofemoral syndrome    Swelling of both ankles    Systemic involvement of connective tissue, unspecified (HCC)    Closed avulsion fracture of lateral malleolus of left fibula, initial encounter         Past Medical History     Past Medical History:   Diagnosis Date    Connective tissue disease (HCC)     Heart murmur     History of hepatitis C 5/1/2020    Lumbosacral radiculopathy 5/1/2020    MRSA (methicillin resistant Staphylococcus aureus)     X2    Scoliosis     Vitamin D deficiency 5/1/2020         Surgical History     Past Surgical History:   Procedure Laterality Date    OTHER SURGICAL HISTORY  2014    ovarian tumer removed, benign    OVARIAN CYST REMOVAL  2009         Family History     Family History   Problem Relation Age of Onset    No Known Problems Mother     No Known  "Problems Father     No Known Problems Sister     No Known Problems Sister     No Known Problems Daughter     No Known Problems Daughter     Ovarian cancer Maternal Grandmother     No Known Problems Maternal Grandfather     Cancer Paternal Grandmother         bladder    No Known Problems Paternal Grandfather     No Known Problems Brother     No Known Problems Maternal Aunt     No Known Problems Maternal Uncle     No Known Problems Paternal Aunt     No Known Problems Paternal Uncle     ADD / ADHD Neg Hx     Anesthesia problems Neg Hx     Clotting disorder Neg Hx     Collagen disease Neg Hx     Diabetes Neg Hx     Dislocations Neg Hx     Learning disabilities Neg Hx     Neurological problems Neg Hx     Osteoporosis Neg Hx     Rheumatologic disease Neg Hx     Scoliosis Neg Hx     Vascular Disease Neg Hx          Social History     Social History     Social History     Tobacco Use   Smoking Status Never    Passive exposure: Past   Smokeless Tobacco Never         Pertinent Lab Values     Lab Results   Component Value Date    CREATININE 0.79 04/03/2023       No results found for: \"PSA\"    @RESULTRCNT(1H])@      Pertinent Imaging       No results found.      Portions of the record may have been created with voice recognition software.  Occasional wrong word or \"sound a like\" substitutions may have occurred due to the inherent limitations of voice recognition software.  In addition some of the content generated from this outpatient encounter includes information designed for patient education and/or communication back to the referring provider.  Read the chart carefully and recognize, using context, where substitutions have occurred.    "

## 2025-02-18 ENCOUNTER — OFFICE VISIT (OUTPATIENT)
Dept: FAMILY MEDICINE CLINIC | Facility: CLINIC | Age: 55
End: 2025-02-18
Payer: COMMERCIAL

## 2025-02-18 VITALS
BODY MASS INDEX: 29.38 KG/M2 | HEIGHT: 67 IN | HEART RATE: 81 BPM | DIASTOLIC BLOOD PRESSURE: 70 MMHG | WEIGHT: 187.2 LBS | TEMPERATURE: 97.1 F | OXYGEN SATURATION: 92 % | SYSTOLIC BLOOD PRESSURE: 112 MMHG

## 2025-02-18 DIAGNOSIS — E78.00 HYPERCHOLESTEROLEMIA: Primary | ICD-10-CM

## 2025-02-18 DIAGNOSIS — E55.9 VITAMIN D DEFICIENCY: ICD-10-CM

## 2025-02-18 DIAGNOSIS — Z00.00 ANNUAL PHYSICAL EXAM: ICD-10-CM

## 2025-02-18 PROCEDURE — 99214 OFFICE O/P EST MOD 30 MIN: CPT | Performed by: FAMILY MEDICINE

## 2025-02-18 RX ORDER — ROSUVASTATIN CALCIUM 10 MG/1
10 TABLET, COATED ORAL DAILY
Qty: 30 TABLET | Refills: 5 | Status: SHIPPED | OUTPATIENT
Start: 2025-02-18

## 2025-02-20 NOTE — PROGRESS NOTES
":  Assessment & Plan  Hypercholesterolemia  Will initiate Crestor therapy and recheck a lipid panel in 6 to 8 weeks time.  Orders:    rosuvastatin (CRESTOR) 10 MG tablet; Take 1 tablet (10 mg total) by mouth daily    Vitamin D deficiency  Continue vitamin D supplementation  Orders:    Vitamin D 25 hydroxy; Future    Annual physical exam    Orders:    Lipid panel; Future    Comprehensive metabolic panel; Future        History of Present Illness     Marcie Goncalves is a 54 y.o. female   The patient presents to discuss results of her cholesterol panel that she had drawn at work which revealed a total cholesterol greater than 300 and an LDL greater than 200.  We discussed ways of getting her cholesterol to come down and she is is happy with trying Crestor and low-dose.  We also discussed the need to recheck a vitamin D which was low in the past and a chemistry.        Review of Systems   Constitutional:  Negative for appetite change, chills and fever.   HENT:  Negative for ear pain, facial swelling, rhinorrhea, sinus pain, sore throat and trouble swallowing.    Eyes:  Negative for pain, discharge, redness and visual disturbance.   Respiratory:  Negative for cough, chest tightness, shortness of breath and wheezing.    Cardiovascular:  Negative for chest pain and palpitations.   Gastrointestinal:  Negative for abdominal pain, diarrhea, nausea and vomiting.   Endocrine: Negative for polyuria.   Genitourinary:  Negative for dysuria, hematuria and urgency.   Musculoskeletal:  Negative for arthralgias and back pain.   Skin:  Negative for color change and rash.   Neurological:  Negative for dizziness, seizures, syncope, weakness and headaches.   Hematological:  Negative for adenopathy.   Psychiatric/Behavioral:  Negative for behavioral problems, confusion and sleep disturbance.    All other systems reviewed and are negative.    Objective   /70   Pulse 81   Temp (!) 97.1 °F (36.2 °C)   Ht 5' 7\" (1.702 m)   Wt 84.9 kg " (187 lb 3.2 oz) Comment: with boot  SpO2 92%   BMI 29.32 kg/m²      Physical Exam  Vitals and nursing note reviewed.   Constitutional:       General: She is not in acute distress.     Appearance: Normal appearance. She is well-developed. She is not ill-appearing or diaphoretic.   HENT:      Head: Normocephalic and atraumatic.      Right Ear: Tympanic membrane, ear canal and external ear normal.      Left Ear: Tympanic membrane, ear canal and external ear normal.      Nose: Nose normal. No congestion or rhinorrhea.      Mouth/Throat:      Mouth: Mucous membranes are moist.      Pharynx: Oropharynx is clear. No oropharyngeal exudate or posterior oropharyngeal erythema.   Eyes:      General: No scleral icterus.        Right eye: No discharge.         Left eye: No discharge.      Extraocular Movements: Extraocular movements intact.      Conjunctiva/sclera: Conjunctivae normal.      Pupils: Pupils are equal, round, and reactive to light.   Neck:      Thyroid: No thyromegaly.      Vascular: No carotid bruit or JVD.      Trachea: No tracheal deviation.   Cardiovascular:      Rate and Rhythm: Normal rate and regular rhythm.      Pulses: Normal pulses.      Heart sounds: Normal heart sounds. No murmur heard.  Pulmonary:      Effort: Pulmonary effort is normal. No respiratory distress.      Breath sounds: Normal breath sounds. No stridor. No wheezing, rhonchi or rales.   Abdominal:      General: Abdomen is flat. Bowel sounds are normal. There is no distension.      Palpations: Abdomen is soft. There is no mass.      Tenderness: There is no abdominal tenderness. There is no guarding or rebound.   Musculoskeletal:         General: No swelling, tenderness or deformity. Normal range of motion.      Cervical back: Normal range of motion and neck supple. No rigidity.      Right lower leg: No edema.      Left lower leg: No edema.   Lymphadenopathy:      Cervical: No cervical adenopathy.   Skin:     General: Skin is warm and dry.       Capillary Refill: Capillary refill takes less than 2 seconds.      Coloration: Skin is not jaundiced.      Findings: No bruising, erythema or rash.   Neurological:      General: No focal deficit present.      Mental Status: She is alert and oriented to person, place, and time.      Cranial Nerves: No cranial nerve deficit.      Sensory: No sensory deficit.      Motor: No abnormal muscle tone.      Coordination: Coordination normal.      Gait: Gait normal.      Deep Tendon Reflexes: Reflexes are normal and symmetric. Reflexes normal.   Psychiatric:         Mood and Affect: Mood normal.         Behavior: Behavior normal.         Thought Content: Thought content normal.         Judgment: Judgment normal.         Administrative Statements   I have spent a total time of 20 minutes in caring for this patient on the day of the visit/encounter including Diagnostic results, Prognosis, Risks and benefits of tx options, Instructions for management, Patient and family education, Importance of tx compliance, and Risk factor reductions.

## 2025-02-24 ENCOUNTER — APPOINTMENT (OUTPATIENT)
Dept: RADIOLOGY | Age: 55
End: 2025-02-24
Payer: COMMERCIAL

## 2025-02-24 ENCOUNTER — OFFICE VISIT (OUTPATIENT)
Dept: OBGYN CLINIC | Facility: CLINIC | Age: 55
End: 2025-02-24
Payer: COMMERCIAL

## 2025-02-24 VITALS — BODY MASS INDEX: 29.35 KG/M2 | WEIGHT: 187 LBS | HEIGHT: 67 IN

## 2025-02-24 DIAGNOSIS — S82.62XA CLOSED AVULSION FRACTURE OF LATERAL MALLEOLUS OF LEFT FIBULA, INITIAL ENCOUNTER: Primary | ICD-10-CM

## 2025-02-24 DIAGNOSIS — S93.402A SPRAIN OF UNSPECIFIED LIGAMENT OF LEFT ANKLE, INITIAL ENCOUNTER: ICD-10-CM

## 2025-02-24 DIAGNOSIS — S82.62XA CLOSED AVULSION FRACTURE OF LATERAL MALLEOLUS OF LEFT FIBULA, INITIAL ENCOUNTER: ICD-10-CM

## 2025-02-24 PROCEDURE — 73610 X-RAY EXAM OF ANKLE: CPT

## 2025-02-24 PROCEDURE — 99213 OFFICE O/P EST LOW 20 MIN: CPT | Performed by: ORTHOPAEDIC SURGERY

## 2025-02-24 NOTE — PROGRESS NOTES
Sports Medicine and Shoulder Surgery    Marcie Goncalves, 54 y.o. female   MRN# [id number]   : 1970            REASON FOR FOLLOW-UP  Marcie Goncalves is a 54 y.o. female who presents for follow-up of the left Ankle    HISTORY OF PRESENT ILLNESS  Following our last visit, we decided to initially treat her Ankle symptoms non-operatively. Our plan was to manage their symptoms conservatively. Today, patient comes in for further evaluation.  Patient said her symptoms are better from when she was last evaluated.  She said the pain and swelling is gone down.  She states she weaned off crutches about a week ago.  She said she still using the cam boot to ambulate. Of note, patient mentions she is a  for [employer].    Patient also mentions for the past year she has been dealing with right knee pain that she describes primarily on the anterior portion of her knee.  She denies any obvious injury or trauma preceding her symptoms.  She describes an crepitance sensation especially when she goes up the stairs.  She mentions she has been try to be more active to help with some of her lower back/scoliosis symptoms.    PHYSICAL EXAM  Musculoskeletal: Left Ankle   Skin Intact               Swelling/ecchymosis over nothing              TTP: Lateral malleolus and ATFL   Range of motion and strength intact   Sensation intact throughout Superficial peroneal, Deep peroneal, Tibial, Sural, Saphenous distributions              EHL/TA/PF motor function intact to testing.               BCR              Gait: Antalgic with CAM boot      DIAGNOSTIC IMAGING:  Independent interpretation of left ankle x-rays demonstrate unchanged alignment of avulsion fracture of the lateral malleolus      IMPRESSION/REPORT/PLAN  Left avulsion fracture of lateral malleolus versus ankle sprain. DOI 25    Recommend WBAT  Recommend transitioning out of the CAM boot over the next couple weeks  Recommend RICE and anti-inflammatories as  needed  We will place referral to PT to help with rehab  Patient is understanding and agreeable to the above plan. She is to call with any other questions or concerns      2.) Right knee pain; possible DJD versus possible internal derangement  We did discuss potentially ordering MRI of the right knee to evaluate for any potential internal derangement but patient preferred to hold off at this time.  Encouraged to follow back up for any other questions or concerns regarding this.    Scribe Attestation      I,:  Cortez Madrigal PA-C am acting as a scribe while in the presence of the attending physician.:       I,:  Justin Torres MD personally performed the services described in this documentation    as scribed in my presence.:

## 2025-02-24 NOTE — LETTER
February 24, 2025     Patient: Marcie Goncalves  YOB: 1970  Date of Visit: 2/24/2025      To Whom it May Concern:    Marcie Goncalves is under my professional care. Marcie was seen in my office on 2/24/2025. Marcie should be excused from work for the following 6 weeks as she continues to recover from orthopedic injury.    If you have any questions or concerns, please don't hesitate to call.         Sincerely,          Justin Torres MD        CC: No Recipients

## 2025-02-25 DIAGNOSIS — E66.09 OBESITY DUE TO EXCESS CALORIES WITHOUT SERIOUS COMORBIDITY, UNSPECIFIED CLASS: Primary | ICD-10-CM

## 2025-02-25 RX ORDER — TIRZEPATIDE 2.5 MG/.5ML
2.5 INJECTION, SOLUTION SUBCUTANEOUS WEEKLY
Qty: 2 ML | Refills: 0 | Status: SHIPPED | OUTPATIENT
Start: 2025-02-25 | End: 2025-02-25 | Stop reason: SDUPTHER

## 2025-02-25 RX ORDER — TIRZEPATIDE 2.5 MG/.5ML
2.5 INJECTION, SOLUTION SUBCUTANEOUS WEEKLY
Qty: 2 ML | Refills: 0 | Status: SHIPPED | OUTPATIENT
Start: 2025-02-25 | End: 2025-03-25

## 2025-02-26 ENCOUNTER — TELEPHONE (OUTPATIENT)
Age: 55
End: 2025-02-26

## 2025-02-26 NOTE — TELEPHONE ENCOUNTER
PA Zepbound 2.5 MG/0.5ML SUBMITTED     to [employer]     via    []CMM-KEY:    [x]Surescripts-Case ID # [id number]  []Availity-Auth ID #  NDC #    []Faxed to plan   []Other website    []Phone call Case ID #      []PA sent as URGENT    All office notes, labs and other pertaining documents and studies sent. Clinical questions answered. Awaiting determination from insurance company.     Turnaround time for your insurance to make a decision on your Prior Authorization can take 7-21 business days.

## 2025-02-27 NOTE — TELEPHONE ENCOUNTER
PA Zepbound 2.5 MG/0.5ML APPROVED         Pharmacy advised by    [x]Fax  [x]Phone call    Specialty Pharmacy    []

## 2025-03-06 ENCOUNTER — EVALUATION (OUTPATIENT)
Dept: PHYSICAL THERAPY | Facility: CLINIC | Age: 55
End: 2025-03-06
Payer: COMMERCIAL

## 2025-03-06 DIAGNOSIS — S82.62XA CLOSED AVULSION FRACTURE OF LATERAL MALLEOLUS OF LEFT FIBULA, INITIAL ENCOUNTER: Primary | ICD-10-CM

## 2025-03-06 DIAGNOSIS — S93.402A SPRAIN OF UNSPECIFIED LIGAMENT OF LEFT ANKLE, INITIAL ENCOUNTER: ICD-10-CM

## 2025-03-06 PROCEDURE — 97161 PT EVAL LOW COMPLEX 20 MIN: CPT | Performed by: PHYSICAL THERAPIST

## 2025-03-06 PROCEDURE — 97112 NEUROMUSCULAR REEDUCATION: CPT | Performed by: PHYSICAL THERAPIST

## 2025-03-06 NOTE — PROGRESS NOTES
PT Evaluation     Today's date: 3/6/2025  Patient name: Marcie Goncalves  : 1970  MRN: [id number]  Referring provider: Cortez Madrigal PA*  Dx:   Encounter Diagnosis     ICD-10-CM    1. Closed avulsion fracture of lateral malleolus of left fibula, initial encounter  S82.62XA Ambulatory Referral to Physical Therapy      2. Sprain of unspecified ligament of left ankle, initial encounter  S93.402A Ambulatory Referral to Physical Therapy          Start Time: 1015  Stop Time: 1053  Total time in clinic (min): 38 minutes    Assessment  Impairments: abnormal gait, abnormal or restricted ROM, activity intolerance, impaired balance, impaired physical strength, lacks appropriate home exercise program, pain with function and weight-bearing intolerance  Symptom irritability: moderate    Assessment details: Marcie Goncalves is a pleasant 54 y.o. female who presents with L lateral malleolus avulsion fx.  The patient's greatest concerns are the pain she is experiencing, worry over not knowing what's wrong, and fear of not being able to keep active.      No further referral appears necessary at this time based upon examination results.    Primary movement impairment diagnosis of deficits in ROM and mm strength in the L foot and ankle consistent with presenting dx resulting in pathoanatomical symptoms of L ankle pain and limiting her ability to exercise or recreation, go to work, perform household chores, stand, and walk.    Primary Impairments:  1) Decreased active and passive ROM of the L foot and ankle   2) Decreased mm strength of the L foot and ankle     Etiologic factors include none recalled by the patient.    Understanding of Dx/Px/POC: good     Prognosis: good  Prognosis details: Positive prognostic indicators include positive attitude toward recovery and good understanding of diagnosis and treatment plan options.  Negative prognostic indicators include high symptom irritability and multiple concurrent orthopedic  problems.      Goals  Patient will be independent with home exercise program.   Patient will be able to manage symptoms independently.     Short Term Goals 2-4 wks   1. Pt will be independent with initial HEP   2. Pt will decrease pain in the L foot/ankle to < 3/10 at worst   3. Pt will improve AROM of the L foot and ankle to WNL and pain free     Long Term Goals 6-8 wks  1. Pt will improve deficient mm strength in the L foot and ankle to 5/5   2. Pt will be able to return to without limitations   3. Pt will improve FOTO score to greater than expected by d/c      Plan  Patient would benefit from: skilled physical therapy  Planned modality interventions: Modalities PRN.    Planned therapy interventions: activity modification, manual therapy, neuromuscular re-education, patient education, therapeutic activities, therapeutic exercise, graded activity, home exercise program, behavior modification, self care, joint mobilization, balance and balance/weight bearing training    Frequency: 1-2x week  Duration in weeks: 6  Plan of Care expiration date: 4/17/2025  Treatment plan discussed with: patient      Subjective Evaluation    History of Present Illness  Mechanism of injury: Marcie Goncalves presents with c/c of left lateral malleolus avulsion fracture. Pt reports initial injury occurred 12/16 when she was running in [place] and tripped over a  cap. Pt went to [facility], 2 days later, had x-ray, which was (-). Pt continued to work for 3 wks until being seen by orthopedic. Pt placed in CAM boot on 1/10 and was OOW. Pt had recent f/u on 2/24 and was instructed to begin to wean out of CAM boot. Pt scheduled to f/u with orthopedic on 4/8. Pt reports hx of lumbar radic and disc issues as well as current SIJ pain 2* wearing the CAM boot. Pt also notes hx of R knee pain that began prior to current injury and has slightly worsened.   Pain location: left foot and ankle, primarily lateral, descriptors: dull, shooting, and  burning  Aggravating factors: weight bearing activities, being out of CAM boot   Relieving factors: rest   24hr pain pattern: 0/10 (current), 0/10 (best), 7-8/10 (worst)   Imaging: X-ray  Previous treatments: none  Occupation/recreation:  (extended periods of standing, walking); wants to return to running, yoga, walking   Sleeping: no disturbed sleep reported   Patient concerns: decreasing pain, improving mobility, improving function, improving strength, and returning to work   Special Questions: (-) Red flag screening        Objective     Tenderness   Left Ankle/Foot   Tenderness in the lateral malleolus and peroneal tendon.     Active Range of Motion   Left Ankle/Foot   Dorsiflexion (ke): 10 degrees   Plantar flexion: 50 degrees   Inversion: 25 degrees with pain  Eversion: 10 degrees with pain    Additional Active Range of Motion Details  Minimal pain reported at available end ranges of inversion and eversion     Joint Play   Left Ankle/Foot  Joints within functional limits are the talocrural joint, subtalar joint, midfoot and forefoot.     Strength/Myotome Testing     Left Ankle/Foot   Dorsiflexion: 3+  Plantar flexion: 4  Inversion: 4  Eversion: 3+  Great toe flexion: 4+  Great toe extension: 4    Additional Strength Details  Minimal pain reported with resisted eversion and inversion     Tests     Additional Tests Details  Assess further at NV 2* time constraints     Ambulation   Weight-Bearing Status   Weight-Bearing Status (Left): weight-bearing as tolerated     Additional Weight-Bearing Status Details  WBAT and beginning to transition out of CAM boot     General Comments:      Ankle/Foot Comments   Pt educated on proper timeline to wean from boot as well as reasons to progression weaning timetable. Pt educated to monitor swelling, soreness, and pain levels as she weans from boot and progresses treatment.              Diagnosis: L Lateral Malleolus Avulsion Fx    Precautions: N/A; Pt to Wean from  CAM Boot as tolerated    POC: 3/6-4/17    Authorization: [employer], 20% Co-Ins    Access Code:  [email or web address]   Visit Count 1 2 3 4 5   Manuals 3/6        L Ankle Stretching                         Neuro Re-Ed        Arch Lift         Toe Yoga         Tandem Stance         Weight Shift                         There Ex         Active warm up        TB Ankle 4-way         Heel Raise         Toe Raise         Calf Towel Stretch                        Ther Act                                                 Modalities                                   Assessment IE        Education S/S, POC, HEP

## 2025-03-10 ENCOUNTER — OFFICE VISIT (OUTPATIENT)
Dept: PHYSICAL THERAPY | Facility: CLINIC | Age: 55
End: 2025-03-10
Payer: COMMERCIAL

## 2025-03-10 ENCOUNTER — HOSPITAL ENCOUNTER (OUTPATIENT)
Dept: RADIOLOGY | Facility: MEDICAL CENTER | Age: 55
Discharge: HOME | End: 2025-03-10
Attending: UROLOGY
Payer: COMMERCIAL

## 2025-03-10 DIAGNOSIS — R10.2 PELVIC AND PERINEAL PAIN: ICD-10-CM

## 2025-03-10 DIAGNOSIS — R31.0 GROSS HEMATURIA: ICD-10-CM

## 2025-03-10 DIAGNOSIS — S93.402A SPRAIN OF UNSPECIFIED LIGAMENT OF LEFT ANKLE, INITIAL ENCOUNTER: ICD-10-CM

## 2025-03-10 DIAGNOSIS — S82.62XA CLOSED AVULSION FRACTURE OF LATERAL MALLEOLUS OF LEFT FIBULA, INITIAL ENCOUNTER: Primary | ICD-10-CM

## 2025-03-10 PROCEDURE — 97112 NEUROMUSCULAR REEDUCATION: CPT | Performed by: PHYSICAL THERAPIST

## 2025-03-10 PROCEDURE — 74178 CT ABD&PLV WO CNTR FLWD CNTR: CPT

## 2025-03-10 PROCEDURE — 97140 MANUAL THERAPY 1/> REGIONS: CPT | Performed by: PHYSICAL THERAPIST

## 2025-03-10 PROCEDURE — 97110 THERAPEUTIC EXERCISES: CPT | Performed by: PHYSICAL THERAPIST

## 2025-03-10 RX ADMIN — IOHEXOL 100 ML: 350 INJECTION, SOLUTION INTRAVENOUS at 13:44

## 2025-03-10 NOTE — PROGRESS NOTES
"Daily Note     Today's date: 3/10/2025  Patient name: Marcie Goncalves  : 1970  MRN: [id number]  Referring provider: Cortez Madrigal PA*  Dx:   Encounter Diagnosis     ICD-10-CM    1. Closed avulsion fracture of lateral malleolus of left fibula, initial encounter  S82.62XA       2. Sprain of unspecified ligament of left ankle, initial encounter  S93.402A           Start Time: 1130  Stop Time: 1210  Total time in clinic (min): 40 minutes    Subjective: Pt reports she was able to complete HEP, but had some discomfort performing ABC's. No new concerns noted at this time.       Objective: See treatment diary below      Assessment: Initiated TE and manual program. Tolerated treatment well. No significant pain reported with exercises. Occasional cuing required for proper exercise technique. HEP reviewed and updated with the patient verbalizing understanding. Progress as tolerated. Patient would benefit from continued PT      Plan: Continue per plan of care.        Diagnosis: L Lateral Malleolus Avulsion Fx    Precautions: N/A; Pt to Wean from CAM Boot as tolerated    POC: 3/6-    Authorization: [employer], 20% [employer]-Ins    Access Code:  [email or web address]   Visit Count 1 2 3 4 5   Manuals 3/6  3/10       L Ankle Stretching   KCB                      Neuro Re-Ed        Arch Lift   20x3\"      Toe Yoga   20x      Tandem Stance         Weight Shift         Baps Board  20x F/B, S/S  Seated       Bridge   20x 3\"       Clamshell   20x ea GTB               There Ex         Active warm up        TB Ankle 4-way   20x ea GTB       Heel Raise   20x seated       Toe Raise   20x seated       Calf Towel Stretch   10x10\" seated      DF Heel Slide   20x       In/EV Towel Slide   2'                        Ther Act                                                 Modalities                                   Assessment IE        Education S/S, POC, HEP                "

## 2025-03-17 ENCOUNTER — RESULTS FOLLOW-UP (OUTPATIENT)
Dept: UROLOGY | Facility: CLINIC | Age: 55
End: 2025-03-17

## 2025-03-17 ENCOUNTER — TELEPHONE (OUTPATIENT)
Dept: FAMILY MEDICINE CLINIC | Facility: CLINIC | Age: 55
End: 2025-03-17

## 2025-03-17 ENCOUNTER — OFFICE VISIT (OUTPATIENT)
Dept: PHYSICAL THERAPY | Facility: CLINIC | Age: 55
End: 2025-03-17
Payer: COMMERCIAL

## 2025-03-17 DIAGNOSIS — S93.402A SPRAIN OF UNSPECIFIED LIGAMENT OF LEFT ANKLE, INITIAL ENCOUNTER: ICD-10-CM

## 2025-03-17 DIAGNOSIS — S82.62XA CLOSED AVULSION FRACTURE OF LATERAL MALLEOLUS OF LEFT FIBULA, INITIAL ENCOUNTER: Primary | ICD-10-CM

## 2025-03-17 PROCEDURE — 97112 NEUROMUSCULAR REEDUCATION: CPT | Performed by: PHYSICAL THERAPIST

## 2025-03-17 PROCEDURE — 97140 MANUAL THERAPY 1/> REGIONS: CPT | Performed by: PHYSICAL THERAPIST

## 2025-03-17 PROCEDURE — 97110 THERAPEUTIC EXERCISES: CPT | Performed by: PHYSICAL THERAPIST

## 2025-03-17 NOTE — TELEPHONE ENCOUNTER
Pt came in to advise that she would like to start back up on the Cymbalta, however she mentioned that the last generic rx she had for this is the one that gave her a bad reaction so she would like to stay away from that.    Pt is also requesting a referral to audiology, she is experiencing hearing issues more than normal. Pt has physical scheduled for April and wasn't sure if she should wait to discuss at her physical appt.

## 2025-03-17 NOTE — LETTER
March 17, 2025     Patient: Marcie Goncalves  YOB: 1970  Date of Visit: 3/17/2025      To Whom it May Concern:    Marcie Goncalves is under my professional care. Marcie was seen in my office on 3/17/2025.    If you have any questions or concerns, please don't hesitate to call.          Sincerely,          Max Hoang, PT        CC: No Recipients

## 2025-03-17 NOTE — PROGRESS NOTES
"Daily Note     Today's date: 3/17/2025  Patient name: Marcie Goncalves  : 1970  MRN: [id number]  Referring provider: Cortez Madrigal PA*  Dx:   Encounter Diagnosis     ICD-10-CM    1. Closed avulsion fracture of lateral malleolus of left fibula, initial encounter  S82.62XA       2. Sprain of unspecified ligament of left ankle, initial encounter  S93.402A           Start Time: 1100  Stop Time: 1145  Total time in clinic (min): 45 minutes    Subjective: Pt reports she had some increased soreness in the L foot/ankle that started Friday into Saturday. No specific activity led to increased symptoms.       Objective: See treatment diary below      Assessment: Tolerated treatment well. Progressed POC with good tolerance from the patient. No significant pain reported with exercises. Occasional cuing required for proper exercise technique. Improved mobility noted in the L foot and ankle, but continues to lack full DF leading to slightly antalgic gait. Progress as tolerated. Patient would benefit from continued PT      Plan: Continue per plan of care.        Diagnosis: L Lateral Malleolus Avulsion Fx    Precautions: N/A; Pt to Wean from CAM Boot as tolerated    POC: 3/6-    Authorization: [employer], 20% Co-Ins    Access Code:  [email or web address]   Visit Count 1 2 3 4 5   Manuals 3/6  3/10  3/17      L Ankle Stretching   KCB KCB                     Neuro Re-Ed        Arch Lift   20x3\" 20x3\"     Toe Yoga   20x 20x     Tandem Stance         Weight Shift         Baps Board  20x F/B, S/S  Seated  20x F/B, S/S  Seated      Bridge   20x 3\"       Clamshell   20x ea GTB       Side Step w/ TB   4x15' RTB at Toes      Step Over    20x 4\" Step LLE             There Ex         Active warm up        TB Ankle 4-way   20x ea GTB       Heel Raise   20x seated  30x seated   20x standing      Toe Raise   20x seated  30x seated   20x standing      Calf Towel Stretch   10x10\" seated 10x10\" seated      DF Heel Slide   20x  20x      In/EV Towel Slide "   2'  2'      Leg Press    2x10 50#                       Ther Act                                                 Modalities                                   Assessment IE        Education S/S, POC, HEP

## 2025-03-17 NOTE — RESULT ENCOUNTER NOTE
[employer] message sent to patient    Good news.  Your CT looks good.  Kidneys and all other abdominal organs look perfect.      The finding in your breast is nonspecific, however I want to be sure that you are up-to-date with your mammograms.  Please call to schedule one if you had already done so this year.    Please followup as scheduled for the next steps in your care.    Nick Perrin MD,PhD  [facility]  [phone]

## 2025-03-24 ENCOUNTER — OFFICE VISIT (OUTPATIENT)
Dept: PHYSICAL THERAPY | Facility: CLINIC | Age: 55
End: 2025-03-24
Payer: COMMERCIAL

## 2025-03-24 ENCOUNTER — TELEPHONE (OUTPATIENT)
Dept: FAMILY MEDICINE CLINIC | Facility: CLINIC | Age: 55
End: 2025-03-24

## 2025-03-24 DIAGNOSIS — S82.62XA CLOSED AVULSION FRACTURE OF LATERAL MALLEOLUS OF LEFT FIBULA, INITIAL ENCOUNTER: Primary | ICD-10-CM

## 2025-03-24 DIAGNOSIS — S93.402A SPRAIN OF UNSPECIFIED LIGAMENT OF LEFT ANKLE, INITIAL ENCOUNTER: ICD-10-CM

## 2025-03-24 DIAGNOSIS — E66.09 OBESITY DUE TO EXCESS CALORIES WITHOUT SERIOUS COMORBIDITY, UNSPECIFIED CLASS: Primary | ICD-10-CM

## 2025-03-24 PROCEDURE — 97110 THERAPEUTIC EXERCISES: CPT | Performed by: PHYSICAL THERAPIST

## 2025-03-24 PROCEDURE — 97140 MANUAL THERAPY 1/> REGIONS: CPT | Performed by: PHYSICAL THERAPIST

## 2025-03-24 PROCEDURE — 97112 NEUROMUSCULAR REEDUCATION: CPT | Performed by: PHYSICAL THERAPIST

## 2025-03-24 RX ORDER — TIRZEPATIDE 5 MG/.5ML
5 INJECTION, SOLUTION SUBCUTANEOUS WEEKLY
Qty: 2 ML | Refills: 0 | Status: SHIPPED | OUTPATIENT
Start: 2025-03-24

## 2025-03-24 NOTE — LETTER
March 24, 2025     Patient: Marcie Goncalves  YOB: 1970  Date of Visit: 3/24/2025      To Whom it May Concern:    Marcie Goncalves is under my professional care. Marcie was seen in my office on 3/24/2025.    If you have any questions or concerns, please don't hesitate to call.          Sincerely,          Max Hoang, PT        CC: No Recipients

## 2025-03-24 NOTE — TELEPHONE ENCOUNTER
Pt stopped into the office to request a script be sent to [facility] in [place] for her Zepbound, for the next higher dose please

## 2025-03-24 NOTE — PROGRESS NOTES
"Daily Note     Today's date: 3/24/2025  Patient name: Marcie Goncalves  : 1970  MRN: [id number]  Referring provider: Cortez Madrigal PA*  Dx:   Encounter Diagnosis     ICD-10-CM    1. Closed avulsion fracture of lateral malleolus of left fibula, initial encounter  S82.62XA       2. Sprain of unspecified ligament of left ankle, initial encounter  S93.402A           Start Time: 1037  Stop Time: 1115  Total time in clinic (min): 38 minutes    Subjective: Pt reports she has some increased soreness and swelling in the L ankle over the weekend. Unsure of cause of increased symptoms.       Objective: See treatment diary below      Assessment: Tolerated treatment well. Continued with current POC with good tolerance from the patient. No significant increase in symptoms reported. Minimal progressions made 2* the patient reporting increased symptoms prior to TX. Progress as tolerated. Patient would benefit from continued PT      Plan: Continue per plan of care.        Diagnosis: L Lateral Malleolus Avulsion Fx    Precautions: N/A; Pt to Wean from CAM Boot as tolerated    POC: 3/6-    Authorization: [employer], 20% [employer]-Ins    Access Code:  [email or web address]   Visit Count 1 2 3 4 5   Manuals 3/6  3/10  3/17  3/24     L Ankle Stretching   KCB KCB KCB                    Neuro Re-Ed        Arch Lift   20x3\" 20x3\" 20x3\"    Toe Yoga   20x 20x 20x     Tandem Stance         Weight Shift         Baps Board  20x F/B, S/S  Seated  20x F/B, S/S  Seated  20x F/B, S/S  Standing     Bridge   20x 3\"       Clamshell   20x ea GTB       Side Step w/ TB   4x15' RTB at Toes  4x15' RTB at Toes     Step Over    20x 4\" Step LLE 20x 4\" step LLE    SLS     5x10\" ea             There Ex         Active warm up        TB Ankle 4-way   20x ea GTB       Heel Raise   20x seated  30x seated   20x standing  30x seated   20x standing     Toe Raise   20x seated  30x seated   20x standing  30x seated   20x standing     Calf Towel Stretch   10x10\" seated 10x10\" seated " " 10x10\" seated     DF Heel Slide   20x  20x  20x     In/EV Towel Slide   2'  2'  2'     Leg Press    2x10 50#  2x10 50#                      Ther Act                                                 Modalities                                   Assessment IE        Education S/S, POC, HEP                    "

## 2025-03-28 ENCOUNTER — TELEPHONE (OUTPATIENT)
Age: 55
End: 2025-03-28

## 2025-03-28 NOTE — TELEPHONE ENCOUNTER
Caller: Patient    Doctor: Dr. Torres / Eliceo CERVANTES    Reason for call: Patient is calling and states that [employer] never received/lost the FMLA forms that were faxed on 3/10. Please re-fax/email forms today if possible, patient states they are due tomorrow - TY.    Call back#: [phone]     Fax: [phone]    Email: [email or web address]

## 2025-03-31 ENCOUNTER — TELEPHONE (OUTPATIENT)
Age: 55
End: 2025-03-31

## 2025-03-31 ENCOUNTER — APPOINTMENT (OUTPATIENT)
Dept: PHYSICAL THERAPY | Facility: CLINIC | Age: 55
End: 2025-03-31
Payer: COMMERCIAL

## 2025-03-31 NOTE — TELEPHONE ENCOUNTER
Caller: Patient    Doctor/Office: Dr Torres    CB#: [phone]      What needs to be faxed: BETO    ATTN to: [place]    Fax#: [phone]      Documents were successfully e-faxed

## 2025-04-01 ENCOUNTER — OFFICE VISIT (OUTPATIENT)
Dept: PHYSICAL THERAPY | Facility: CLINIC | Age: 55
End: 2025-04-01
Payer: COMMERCIAL

## 2025-04-01 ENCOUNTER — TELEPHONE (OUTPATIENT)
Dept: FAMILY MEDICINE CLINIC | Facility: CLINIC | Age: 55
End: 2025-04-01

## 2025-04-01 DIAGNOSIS — S82.62XA CLOSED AVULSION FRACTURE OF LATERAL MALLEOLUS OF LEFT FIBULA, INITIAL ENCOUNTER: Primary | ICD-10-CM

## 2025-04-01 DIAGNOSIS — R53.83 OTHER FATIGUE: Primary | ICD-10-CM

## 2025-04-01 DIAGNOSIS — S93.402A SPRAIN OF UNSPECIFIED LIGAMENT OF LEFT ANKLE, INITIAL ENCOUNTER: ICD-10-CM

## 2025-04-01 PROCEDURE — 97110 THERAPEUTIC EXERCISES: CPT

## 2025-04-01 PROCEDURE — 97112 NEUROMUSCULAR REEDUCATION: CPT

## 2025-04-01 PROCEDURE — 97140 MANUAL THERAPY 1/> REGIONS: CPT

## 2025-04-01 NOTE — TELEPHONE ENCOUNTER
Pt stopped in to ask that you add labs to check her thyroid to the current orders in her chart She states that she has been working with a  through [facility] and they suggested it    Pt would like a call when order is placed

## 2025-04-01 NOTE — PROGRESS NOTES
"Daily Note     Today's date: 2025  Patient name: Marcie Goncalves  : 1970  MRN: [id number]  Referring provider: Cortez Madrigal PA*  Dx:   Encounter Diagnosis     ICD-10-CM    1. Closed avulsion fracture of lateral malleolus of left fibula, initial encounter  S82.62XA       2. Sprain of unspecified ligament of left ankle, initial encounter  S93.402A                      Subjective: Pt c/o L lateral foot/ankle discomfort this morning.  Pt states she has good and bad days but overall is a \"little worse\" than last week due to being out of the boot/walking more.  Pt reports worst pain 5-6/10.      Objective: See treatment diary below      Assessment: Tolerated treatment well. Patient exhibited good technique with therapeutic exercises and would benefit from continued PT.  No significant swelling noted in L ankle.  No increased discomfort noted w/ TE performed.  Most challenged by SLS, stairs.       Plan: Continue per plan of care. Pt has MDV  on ; will call to schedule following MDV (as needed)       Diagnosis: L Lateral Malleolus Avulsion Fx    Precautions: N/A; Pt to Wean from CAM Boot as tolerated    POC: 3/6-    Authorization: [employer], 20% Co-Ins    Access Code:  [email or web address]   Visit Count 1 2 3 4 5   Manuals 3/6  3/10  3/17  3/24  4/1   L Ankle Stretching   KCB KCB KCB LM                   Neuro Re-Ed        Arch Lift   20x3\" 20x3\" 20x3\" 20x3\"   Toe Yoga   20x 20x 20x  20x    Tandem Stance         Weight Shift         Baps Board  20x F/B, S/S  Seated  20x F/B, S/S  Seated  20x F/B, S/S  Standing  20x F/B, S/S seated   Bridge   20x 3\"       Clamshell   20x ea GTB       Side Step w/ TB   4x15' RTB at Toes  4x15' RTB at Toes  3 half laps RTB at toes   Step Over    20x 4\" Step LLE 20x 4\" step LLE 4\" step LLE x10 up only; x10 over   SLS     5x10\" ea  5x10\" L           There Ex         Active warm up        TB Ankle 4-way   20x ea GTB       Heel Raise   20x seated  30x seated   20x standing  30x seated   20x " "standing  30x seated   20x stand   Toe Raise   20x seated  30x seated   20x standing  30x seated   20x standing  30x seated  20x stand   Calf Towel Stretch   10x10\" seated 10x10\" seated  10x10\" seated  10x10\" seated   DF Heel Slide   20x  20x  20x     In/EV Towel Slide   2'  2'  2'  2'   Leg Press    2x10 50#  2x10 50#  50# 2x10                    Ther Act                                                 Modalities                                   Assessment IE        Education S/S, POC, HEP                      "

## 2025-04-07 ENCOUNTER — OFFICE VISIT (OUTPATIENT)
Dept: OBGYN CLINIC | Facility: CLINIC | Age: 55
End: 2025-04-07
Payer: COMMERCIAL

## 2025-04-07 VITALS — HEIGHT: 67 IN | BODY MASS INDEX: 29.03 KG/M2 | WEIGHT: 185 LBS

## 2025-04-07 DIAGNOSIS — S82.62XA CLOSED AVULSION FRACTURE OF LATERAL MALLEOLUS OF LEFT FIBULA, INITIAL ENCOUNTER: Primary | ICD-10-CM

## 2025-04-07 PROCEDURE — 99214 OFFICE O/P EST MOD 30 MIN: CPT | Performed by: ORTHOPAEDIC SURGERY

## 2025-04-07 NOTE — PROGRESS NOTES
Sports Medicine and Shoulder Surgery    Marcie Goncalves, 55 y.o. female   MRN# [id number]   : 1970          REASON FOR FOLLOW-UP  Marcie Goncalves is a 55 y.o. female who presents for follow-up of the left Ankle    HISTORY OF PRESENT ILLNESS  Following our last visit, we decided to initially treat her Ankle symptoms non-operatively. Our plan was to manage their symptoms conservatively with Rest, Ice, Compression, Elevation, Activity Modification, Anti-inflammatory Medication, Physical Therapy, and Bracing. Today, patient comes in for further evaluation. She claims that her ankle is pretty swollen and painful after increased activity.she has been performing physical therapy with moderate improvements.       Musculoskeletal: Left Ankle   Skin Intact               Swelling/ecchymosis over Lateral Malleolus               TTP: Lateral malleolus   limited due to pain and stiffness   Sensation intact throughout Superficial peroneal, Deep peroneal, Tibial, Sural, Saphenous distributions              EHL/TA/PF motor function intact to testing.               BCR              Gait: Normal and Antalgic      DIAGNOSTIC IMAGING:  X-rays reviewed today independent interpretation demonstrates appropriate interval healing of the left ankle fracture.  Mortise intact.  Soft Tissues unremarkable      Assessment & Plan  Closed avulsion fracture of lateral malleolus of left fibula, initial encounter  Recommends WBAT  Recommends to continue HEP   Wear supportive shoe while weightbearing   Updated work note provided   If any issues, questions, or concerns arise between now and the next appointment, we have encouraged the patient contact our team.   Orders:    XR ankle 3+ vw left; Future             Scribe Attestation      I,:  Graeme Parikh am acting as a scribe while in the presence of the attending physician.:       I,:  Justin Torres MD personally performed the services described in this documentation    as scribed in my presence.:

## 2025-04-07 NOTE — ASSESSMENT & PLAN NOTE
Recommends WBAT  Recommends to continue HEP   Wear supportive shoe while weightbearing   Updated work note provided   If any issues, questions, or concerns arise between now and the next appointment, we have encouraged the patient contact our team.   Orders:    XR ankle 3+ vw left; Future

## 2025-04-07 NOTE — LETTER
April 7, 2025     Patient: Marcie Goncalves  YOB: 1970  Date of Visit: 4/7/2025      To Whom it May Concern:    Marcie Goncalves is under my professional care. Marcie was seen in my office on 4/7/2025. Marcie should remain out of work for additional 6 weeks.    If you have any questions or concerns, please don't hesitate to call.         Sincerely,          Justin Torres MD        CC: No Recipients

## 2025-04-17 ENCOUNTER — APPOINTMENT (OUTPATIENT)
Dept: LAB | Facility: CLINIC | Age: 55
End: 2025-04-17
Payer: COMMERCIAL

## 2025-04-17 ENCOUNTER — RESULTS FOLLOW-UP (OUTPATIENT)
Dept: FAMILY MEDICINE CLINIC | Facility: CLINIC | Age: 55
End: 2025-04-17

## 2025-04-17 DIAGNOSIS — Z00.00 ANNUAL PHYSICAL EXAM: ICD-10-CM

## 2025-04-17 DIAGNOSIS — E55.9 VITAMIN D DEFICIENCY: ICD-10-CM

## 2025-04-17 DIAGNOSIS — R53.83 OTHER FATIGUE: Primary | ICD-10-CM

## 2025-04-17 DIAGNOSIS — E66.09 OBESITY DUE TO EXCESS CALORIES WITHOUT SERIOUS COMORBIDITY, UNSPECIFIED CLASS: ICD-10-CM

## 2025-04-17 LAB
25(OH)D3 SERPL-MCNC: 37.1 NG/ML (ref 30–100)
ALBUMIN SERPL BCG-MCNC: 4.4 G/DL (ref 3.5–5)
ALP SERPL-CCNC: 62 U/L (ref 34–104)
ALT SERPL W P-5'-P-CCNC: 15 U/L (ref 7–52)
ANION GAP SERPL CALCULATED.3IONS-SCNC: 8 MMOL/L (ref 4–13)
AST SERPL W P-5'-P-CCNC: 16 U/L (ref 13–39)
BILIRUB SERPL-MCNC: 0.66 MG/DL (ref 0.2–1)
BUN SERPL-MCNC: 16 MG/DL (ref 5–25)
CALCIUM SERPL-MCNC: 8.8 MG/DL (ref 8.4–10.2)
CHLORIDE SERPL-SCNC: 104 MMOL/L (ref 96–108)
CHOLEST SERPL-MCNC: 146 MG/DL (ref ?–200)
CO2 SERPL-SCNC: 27 MMOL/L (ref 21–32)
CREAT SERPL-MCNC: 0.8 MG/DL (ref 0.6–1.3)
GFR SERPL CREATININE-BSD FRML MDRD: 83 ML/MIN/1.73SQ M
GLUCOSE P FAST SERPL-MCNC: 103 MG/DL (ref 65–99)
HDLC SERPL-MCNC: 55 MG/DL
LDLC SERPL CALC-MCNC: 71 MG/DL (ref 0–100)
NONHDLC SERPL-MCNC: 91 MG/DL
POTASSIUM SERPL-SCNC: 4.2 MMOL/L (ref 3.5–5.3)
PROT SERPL-MCNC: 6.6 G/DL (ref 6.4–8.4)
SODIUM SERPL-SCNC: 139 MMOL/L (ref 135–147)
TRIGL SERPL-MCNC: 102 MG/DL (ref ?–150)
TSH SERPL DL<=0.05 MIU/L-ACNC: 1.09 UIU/ML (ref 0.45–4.5)

## 2025-04-17 PROCEDURE — 82306 VITAMIN D 25 HYDROXY: CPT

## 2025-04-17 PROCEDURE — 80053 COMPREHEN METABOLIC PANEL: CPT

## 2025-04-17 PROCEDURE — 36415 COLL VENOUS BLD VENIPUNCTURE: CPT

## 2025-04-17 PROCEDURE — 80061 LIPID PANEL: CPT

## 2025-04-17 PROCEDURE — 84443 ASSAY THYROID STIM HORMONE: CPT

## 2025-04-17 RX ORDER — TIRZEPATIDE 7.5 MG/.5ML
7.5 INJECTION, SOLUTION SUBCUTANEOUS WEEKLY
Qty: 2 ML | Refills: 0 | Status: SHIPPED | OUTPATIENT
Start: 2025-04-17

## 2025-04-17 RX ORDER — TIRZEPATIDE 5 MG/.5ML
5 INJECTION, SOLUTION SUBCUTANEOUS WEEKLY
Qty: 2 ML | Refills: 0 | Status: CANCELLED | OUTPATIENT
Start: 2025-04-17

## 2025-04-17 NOTE — TELEPHONE ENCOUNTER
Pt stopped in to ask if you can please refill her Zepbound and increase to the next dose     Please send to [facility] on [address]

## 2025-04-24 ENCOUNTER — TELEPHONE (OUTPATIENT)
Dept: OBGYN CLINIC | Facility: MEDICAL CENTER | Age: 55
End: 2025-04-24

## 2025-04-24 ENCOUNTER — TELEPHONE (OUTPATIENT)
Age: 55
End: 2025-04-24

## 2025-04-24 NOTE — TELEPHONE ENCOUNTER
Caller: Zhou/nurse Porter/Short term disability    Doctor: Dr. Torres    Reason for call: Questioned fax # for [facility]? Provided [phone]    Call back#: [phone]

## 2025-04-24 NOTE — TELEPHONE ENCOUNTER
Caller: Marcie     Doctor: Dr. Torres    Reason for call: Work is questioning her time off. Does she need off until May 19  (per work) ? Work is asking this    She will need it is writing regarding PT being done at home due to expense. This was discussed in the office.    Please place new note into my chart.      Call back#: [phone]

## 2025-04-28 ENCOUNTER — OFFICE VISIT (OUTPATIENT)
Dept: FAMILY MEDICINE CLINIC | Facility: CLINIC | Age: 55
End: 2025-04-28
Payer: COMMERCIAL

## 2025-04-28 VITALS
SYSTOLIC BLOOD PRESSURE: 114 MMHG | OXYGEN SATURATION: 97 % | WEIGHT: 182 LBS | DIASTOLIC BLOOD PRESSURE: 78 MMHG | HEART RATE: 70 BPM | TEMPERATURE: 97.3 F | BODY MASS INDEX: 28.56 KG/M2 | HEIGHT: 67 IN

## 2025-04-28 DIAGNOSIS — F41.0 PANIC ATTACKS: ICD-10-CM

## 2025-04-28 DIAGNOSIS — M54.17 LUMBOSACRAL RADICULOPATHY: ICD-10-CM

## 2025-04-28 DIAGNOSIS — F41.9 ANXIETY: Primary | ICD-10-CM

## 2025-04-28 PROCEDURE — 99214 OFFICE O/P EST MOD 30 MIN: CPT | Performed by: FAMILY MEDICINE

## 2025-04-28 RX ORDER — HYDROXYZINE HYDROCHLORIDE 10 MG/1
10 TABLET, FILM COATED ORAL EVERY 6 HOURS PRN
Qty: 30 TABLET | Refills: 3 | Status: SHIPPED | OUTPATIENT
Start: 2025-04-28

## 2025-04-28 RX ORDER — DICLOFENAC SODIUM 75 MG/1
75 TABLET, DELAYED RELEASE ORAL 2 TIMES DAILY
Qty: 60 TABLET | Refills: 3 | Status: SHIPPED | OUTPATIENT
Start: 2025-04-28

## 2025-04-28 RX ORDER — DESVENLAFAXINE 50 MG/1
50 TABLET, FILM COATED, EXTENDED RELEASE ORAL DAILY
Qty: 30 TABLET | Refills: 2 | Status: SHIPPED | OUTPATIENT
Start: 2025-04-28

## 2025-04-29 NOTE — PROGRESS NOTES
:  Assessment & Plan  Anxiety  We will switch the patient to Pristiq and follow-up with her in a proximately 2 weeks time.  Orders:    desvenlafaxine succinate (PRISTIQ) 50 mg 24 hr tablet; Take 1 tablet (50 mg total) by mouth daily    Panic attacks  Continue with current therapy  Orders:    hydrOXYzine HCL (ATARAX) 10 mg tablet; Take 1 tablet (10 mg total) by mouth every 6 (six) hours as needed for itching    Lumbosacral radiculopathy    Orders:    diclofenac (VOLTAREN) 75 mg EC tablet; Take 1 tablet (75 mg total) by mouth 2 (two) times a day        History of Present Illness     Marcie Goncalves is a 55 y.o. female   The patient presents for follow-up of anxiety.  She is currently on 30 mg of Cymbalta but does not think it is working as well for anxiety and she is concerned is going to cause weight gain and sexual dysfunction and we discussed changing her to Pristiq and she is willing to do so.  She needs refills of her meds for lumbosacral pain.  She denies any other new complaints.  She finds she rarely has to take the hydroxyzine for anxiety.      Review of Systems   Constitutional:  Negative for appetite change, chills and fever.   HENT:  Negative for ear pain, facial swelling, rhinorrhea, sinus pain, sore throat and trouble swallowing.    Eyes:  Negative for discharge and redness.   Respiratory:  Negative for chest tightness, shortness of breath and wheezing.    Cardiovascular:  Negative for chest pain and palpitations.   Gastrointestinal:  Negative for abdominal pain, diarrhea, nausea and vomiting.   Endocrine: Negative for polyuria.   Genitourinary:  Negative for dysuria and urgency.   Musculoskeletal:  Negative for arthralgias and back pain.   Skin:  Negative for rash.   Neurological:  Negative for dizziness, weakness and headaches.   Hematological:  Negative for adenopathy.   Psychiatric/Behavioral:  Positive for agitation. Negative for behavioral problems, confusion, decreased concentration, dysphoric mood,  "hallucinations, self-injury, sleep disturbance and suicidal ideas. The patient is nervous/anxious. The patient is not hyperactive.    All other systems reviewed and are negative.    Objective   /78   Pulse 70   Temp (!) 97.3 °F (36.3 °C)   Ht 5' 7\" (1.702 m)   Wt 82.6 kg (182 lb)   SpO2 97%   BMI 28.51 kg/m²      Physical Exam  Vitals and nursing note reviewed.   Constitutional:       General: She is not in acute distress.     Appearance: Normal appearance. She is well-developed. She is not ill-appearing or diaphoretic.   HENT:      Head: Normocephalic and atraumatic.      Right Ear: Tympanic membrane, ear canal and external ear normal.      Left Ear: Tympanic membrane, ear canal and external ear normal.      Nose: Nose normal. No congestion or rhinorrhea.      Mouth/Throat:      Mouth: Mucous membranes are moist.      Pharynx: Oropharynx is clear. No oropharyngeal exudate or posterior oropharyngeal erythema.   Eyes:      General: No scleral icterus.        Right eye: No discharge.         Left eye: No discharge.      Extraocular Movements: Extraocular movements intact.      Conjunctiva/sclera: Conjunctivae normal.      Pupils: Pupils are equal, round, and reactive to light.   Neck:      Thyroid: No thyromegaly.      Vascular: No carotid bruit or JVD.      Trachea: No tracheal deviation.   Cardiovascular:      Rate and Rhythm: Normal rate and regular rhythm.      Pulses: Normal pulses.      Heart sounds: Normal heart sounds. No murmur heard.  Pulmonary:      Effort: Pulmonary effort is normal. No respiratory distress.      Breath sounds: Normal breath sounds. No stridor. No wheezing, rhonchi or rales.   Abdominal:      General: Abdomen is flat. Bowel sounds are normal. There is no distension.      Palpations: Abdomen is soft. There is no mass.      Tenderness: There is no abdominal tenderness. There is no guarding or rebound.   Musculoskeletal:         General: No swelling, tenderness or deformity. " Normal range of motion.      Cervical back: Normal range of motion and neck supple. No rigidity.      Right lower leg: No edema.      Left lower leg: No edema.   Lymphadenopathy:      Cervical: No cervical adenopathy.   Skin:     General: Skin is warm and dry.      Capillary Refill: Capillary refill takes less than 2 seconds.      Coloration: Skin is not jaundiced.      Findings: No bruising, erythema or rash.   Neurological:      General: No focal deficit present.      Mental Status: She is alert and oriented to person, place, and time.      Cranial Nerves: No cranial nerve deficit.      Sensory: No sensory deficit.      Motor: No abnormal muscle tone.      Coordination: Coordination normal.      Gait: Gait normal.      Deep Tendon Reflexes: Reflexes are normal and symmetric. Reflexes normal.   Psychiatric:         Behavior: Behavior normal.         Thought Content: Thought content normal.         Judgment: Judgment normal.      Comments: Positive anxiety         Administrative Statements   I have spent a total time of 20 minutes in caring for this patient on the day of the visit/encounter including Diagnostic results, Prognosis, Risks and benefits of tx options, Instructions for management, Patient and family education, Importance of tx compliance, Risk factor reductions, and Impressions.

## 2025-04-29 NOTE — ASSESSMENT & PLAN NOTE
Orders:    diclofenac (VOLTAREN) 75 mg EC tablet; Take 1 tablet (75 mg total) by mouth 2 (two) times a day

## 2025-05-08 NOTE — PROGRESS NOTES
PT Re-Evaluation     Today's date: 2025  Patient name: Marcie Goncalves  : 1970  MRN: [id number]  Referring provider: Cortez Madrigal PA*  Dx:   Encounter Diagnosis     ICD-10-CM    1. Closed avulsion fracture of lateral malleolus of left fibula with routine healing, subsequent encounter  S82.62XD       2. Sprain of unspecified ligament of left ankle, subsequent encounter  S93.402D                        Assessment  Impairments: abnormal gait, abnormal or restricted ROM, activity intolerance, impaired balance, impaired physical strength, lacks appropriate home exercise program, pain with function and weight-bearing intolerance  Symptom irritability: moderate    Assessment details: From RE on 25: Marcie Goncalves is a 55 y.o. female who presents with signs and symptoms consistent of referring diagnosis based off of subjective/objective findings. Patient has made minimal progress since initiating OPPT secondary to recent exacerbation of pain and limited ability to attend OPPT. Patient presents with diffuse foot and ankle pain focused around the lateral malleolus, peroneal tendon, and forefoot which impacts her quality of life and limits daily function with impaired AROM and strength/loading tolerance. Due to these impairments, Patient continues to have difficulty performing recreational activities, work related activities, and engaging in social activities. Patient would continue to benefit from a comprehensive HEP focusing on improving said deficits. Patient would benefit from skilled physical therapy to address the impairments, improve their level of function, and to improve their overall quality of life. PT POC 2x/wk for 6 weeks. Thank you for this referral.      From IE: Marcie Goncalves is a pleasant 54 y.o. female who presents with L lateral malleolus avulsion fx.  The patient's greatest concerns are the pain she is experiencing, worry over not knowing what's wrong, and fear of not being able to keep  active.      No further referral appears necessary at this time based upon examination results.    Primary movement impairment diagnosis of deficits in ROM and mm strength in the L foot and ankle consistent with presenting dx resulting in pathoanatomical symptoms of L ankle pain and limiting her ability to exercise or recreation, go to work, perform household chores, stand, and walk.    Primary Impairments:  1) Decreased active and passive ROM of the L foot and ankle   2) Decreased mm strength of the L foot and ankle     Etiologic factors include none recalled by the patient.    Understanding of Dx/Px/POC: good     Prognosis: good  Prognosis details: Positive prognostic indicators include positive attitude toward recovery and good understanding of diagnosis and treatment plan options.  Negative prognostic indicators include high symptom irritability and multiple concurrent orthopedic problems.      Goals  Patient will be independent with home exercise program.   Patient will be able to manage symptoms independently.     Not met secondary to recent exacerbation of sxs- 5/9    Short Term Goals 2-4 wks   1. Pt will be independent with initial HEP   2. Pt will decrease pain in the L foot/ankle to < 3/10 at worst   3. Pt will improve AROM of the L foot and ankle to WNL and pain free     Long Term Goals 6-8 wks  1. Pt will improve deficient mm strength in the L foot and ankle to 5/5   2. Pt will be able to return to without limitations   3. Pt will improve FOTO score to greater than expected by d/c      Plan  Patient would benefit from: skilled physical therapy  Planned modality interventions: Modalities PRN.    Planned therapy interventions: activity modification, manual therapy, neuromuscular re-education, patient education, therapeutic activities, therapeutic exercise, graded activity, home exercise program, behavior modification, self care, joint mobilization, balance and balance/weight bearing training    Frequency:  1-2x week  Duration in weeks: 6  Plan of Care expiration date: 4/17/2025  Treatment plan discussed with: patient        Subjective Evaluation    History of Present Illness  Mechanism of injury: Marcie Goncalves presents with c/c of left lateral malleolus avulsion fracture. Pt reports initial injury occurred 12/16 when she was running in [place] and tripped over a  cap. Pt went to [facility], 2 days later, had x-ray, which was (-). Pt continued to work for 3 wks until being seen by orthopedic. Pt placed in CAM boot on 1/10 and was OOW. Pt had recent f/u on 2/24 and was instructed to begin to wean out of CAM boot. Pt scheduled to f/u with orthopedic on 4/8. Pt reports hx of lumbar radic and disc issues as well as current SIJ pain 2* wearing the CAM boot. Pt also notes hx of R knee pain that began prior to current injury and has slightly worsened.   Pain location: left foot and ankle, primarily lateral, descriptors: dull, shooting, and burning  Aggravating factors: weight bearing activities, being out of CAM boot   Relieving factors: rest   24hr pain pattern: 0/10 (current), 0/10 (best), 7-8/10 (worst)   Imaging: X-ray  Previous treatments: none  Occupation/recreation:  (extended periods of standing, walking); wants to return to running, yoga, walking   Sleeping: no disturbed sleep reported   Patient concerns: decreasing pain, improving mobility, improving function, improving strength, and returning to work   Special Questions: (-) Red flag screening      Interval history: Marcie reports catching her foot in a door in her home about 2 weeks ago with increased foot pain since that time. She notes increased lateral ankle pain and pain into the forefoot which limits her ability to walk and perform many of her daily tasks. Finds increased difficulty with going onto her toes. Will see referring provider later today.       Objective     Tenderness   Left Ankle/Foot   Tenderness in the lateral malleolus and  "peroneal tendon. No tenderness in the fifth metatarsal base, dorsum foot, fibula, medial calcaneus and medial malleolus.     Active Range of Motion   Left Ankle/Foot   Dorsiflexion (ke): 0 degrees with pain  Plantar flexion: 40 degrees with pain  Inversion: 25 degrees with pain  Eversion: 10 degrees     Additional Active Range of Motion Details  Some pain reported at available end ranges of inversion and eversion     Passive Range of Motion   Left Ankle/Foot    Dorsiflexion (ke): 10 degrees     Joint Play   Left Ankle/Foot  Joints within functional limits are the subtalar joint, midfoot and forefoot. Hypomobile in the talocrural joint.     Strength/Myotome Testing     Left Ankle/Foot   Dorsiflexion: 4-  Plantar flexion: 4-  Inversion: 4-  Eversion: 3  Great toe flexion: 4+  Great toe extension: 4    Additional Strength Details  Pain with all motions except for DF     Tests   Left Ankle/Foot   Negative for anterior drawer, calcaneal squeeze, percussion and posterior drawer.     Ambulation   Weight-Bearing Status   Weight-Bearing Status (Left): weight-bearing as tolerated     Additional Weight-Bearing Status Details  WBAT and beginning to transition out of CAM boot              Diagnosis: L Lateral Malleolus Avulsion Fx    Precautions: N/A; Pt to Wean from CAM Boot as tolerated    POC: 3/6-4/17    Authorization: [employer], 20% Co-Ins    Access Code:  [email or web address]   Visit Count 6 2 3 4 5   Manuals 5/9 3/10  3/17  3/24  4/1   L Ankle Stretching  EB  KCB KCB KCB LM   Re-eval  EB       TC Mobs  EB        Neuro Re-Ed        Arch Lift   20x3\" 20x3\" 20x3\" 20x3\"   Toe Yoga   20x 20x 20x  20x    Tandem Stance         Weight Shift         Baps Board  20x F/B, S/S  Seated  20x F/B, S/S  Seated  20x F/B, S/S  Standing  20x F/B, S/S seated   Bridge   20x 3\"       Clamshell   20x ea GTB       Side Step w/ TB   4x15' RTB at Toes  4x15' RTB at Toes  3 half laps RTB at toes   Step Over    20x 4\" Step LLE 20x 4\" step LLE 4\" step LLE x10 up " "only; x10 over   SLS     5x10\" ea  5x10\" L   Patient education EB- prognosis, HEP, education of current condition       There Ex         Active warm up        TB Ankle 4-way   20x ea GTB       Heel Raise   20x seated  30x seated   20x standing  30x seated   20x standing  30x seated   20x stand   Toe Raise   20x seated  30x seated   20x standing  30x seated   20x standing  30x seated  20x stand   Calf Towel Stretch   10x10\" seated 10x10\" seated  10x10\" seated  10x10\" seated   DF Heel Slide   20x  20x  20x     In/EV Towel Slide   2'  2'  2'  2'   Leg Press    2x10 50#  2x10 50#  50# 2x10                    Ther Act                                                 Modalities                                   Assessment IE        Education S/S, POC, HEP                  "

## 2025-05-09 ENCOUNTER — OFFICE VISIT (OUTPATIENT)
Dept: OBGYN CLINIC | Facility: CLINIC | Age: 55
End: 2025-05-09
Payer: COMMERCIAL

## 2025-05-09 ENCOUNTER — APPOINTMENT (OUTPATIENT)
Dept: RADIOLOGY | Age: 55
End: 2025-05-09
Attending: ORTHOPAEDIC SURGERY
Payer: COMMERCIAL

## 2025-05-09 ENCOUNTER — EVALUATION (OUTPATIENT)
Dept: PHYSICAL THERAPY | Facility: CLINIC | Age: 55
End: 2025-05-09
Payer: COMMERCIAL

## 2025-05-09 VITALS — BODY MASS INDEX: 28.56 KG/M2 | HEIGHT: 67 IN | WEIGHT: 182 LBS

## 2025-05-09 DIAGNOSIS — S82.62XA CLOSED AVULSION FRACTURE OF LATERAL MALLEOLUS OF LEFT FIBULA, INITIAL ENCOUNTER: Primary | ICD-10-CM

## 2025-05-09 DIAGNOSIS — S93.402D SPRAIN OF UNSPECIFIED LIGAMENT OF LEFT ANKLE, SUBSEQUENT ENCOUNTER: ICD-10-CM

## 2025-05-09 DIAGNOSIS — S82.62XA CLOSED AVULSION FRACTURE OF LATERAL MALLEOLUS OF LEFT FIBULA, INITIAL ENCOUNTER: ICD-10-CM

## 2025-05-09 DIAGNOSIS — S82.62XD CLOSED AVULSION FRACTURE OF LATERAL MALLEOLUS OF LEFT FIBULA WITH ROUTINE HEALING, SUBSEQUENT ENCOUNTER: Primary | ICD-10-CM

## 2025-05-09 PROCEDURE — 97112 NEUROMUSCULAR REEDUCATION: CPT

## 2025-05-09 PROCEDURE — 73610 X-RAY EXAM OF ANKLE: CPT

## 2025-05-09 PROCEDURE — 73630 X-RAY EXAM OF FOOT: CPT

## 2025-05-09 PROCEDURE — 97140 MANUAL THERAPY 1/> REGIONS: CPT

## 2025-05-09 PROCEDURE — 99214 OFFICE O/P EST MOD 30 MIN: CPT | Performed by: ORTHOPAEDIC SURGERY

## 2025-05-09 NOTE — LETTER
May 9, 2025     Patient: Marcie Goncalves  YOB: 1970  Date of Visit: 5/9/2025      To Whom it May Concern:    Marcie Goncalves is under my professional care. Marcie was seen in my office on 5/9/2025. Patient to be on light duty/desk duty. She should avoid heavy pivoting, cutting, and twisting activities. She should avoid being on her feet for more than 15 minutes at a time without an hour break. If light duty/desk duty does not exist, patient should be excused and work. This should last from today until 6/16/25.    If you have any questions or concerns, please don't hesitate to call.         Sincerely,          Justin Torres MD        CC: No Recipients

## 2025-05-09 NOTE — ASSESSMENT & PLAN NOTE
Orders:    XR ankle 3+ vw left; Future    XR foot 3+ vw left; Future    XR ankle 3+ vw left; Future

## 2025-05-09 NOTE — PROGRESS NOTES
Sports Medicine and Shoulder Surgery    Marcie Goncalves, 55 y.o. female   MRN# [id number]   : 1970        REASON FOR FOLLOW-UP  Marcie Goncalves is a 55 y.o. female who presents for follow-up of the left Ankle    HISTORY OF PRESENT ILLNESS  Following our last visit, we decided to initially treat her Ankle symptoms non-operatively. Our plan was to manage their symptoms conservatively. Today, patient comes in for further evaluation.  Patient said that 2 weeks ago she was walking when she lost her balance and reinjured her left ankle.  She describes pain primarily in the lateral aspect of the ankle as well as the top part of her foot. Her original DOI 24.      PHYSICAL EXAM  Musculoskeletal: Left Ankle   Skin Intact               Swelling/ecchymosis over lateral malleolus              TTP: none   Range of motion and strength limited due to pain and stiffness   Sensation intact throughout Superficial peroneal, Deep peroneal, Tibial, Sural, Saphenous distributions              EHL/TA/PF motor function intact to testing.               BCR              Gait: Normal      DIAGNOSTIC IMAGING:  Independent interpretation of the left ankle x-ray demonstrates avulsion fracture of lateral malleolus, mild soft tissue swelling of lateral malleolus. Unremarkable radiographs of foot.       Assessment & Plan  Closed avulsion fracture of lateral malleolus of left fibula, initial encounter    Orders:    XR ankle 3+ vw left; Future    XR foot 3+ vw left; Future    XR ankle 3+ vw left; Future         Recommend WBAT with ROM as tolerated  Recommend RICE and anti-inflammatories as needed  Encouraged to continue to work with PT on rehab  Work note created  Follow up as needed  Patient is understanding and agreeable to the above plan. She is to call with any other questions or concerns.    The complexity of the medical decision making, including the comprehensive history, detailed examination and moderate risk assessment, and time  spent with patient supports coding at a Level 4 for this encounter       Scribe Attestation      I,:  Cortez Madrigal PA-C am acting as a scribe while in the presence of the attending physician.:       I,:  Justin Torres MD personally performed the services described in this documentation    as scribed in my presence.:

## 2025-05-16 ENCOUNTER — OFFICE VISIT (OUTPATIENT)
Dept: FAMILY MEDICINE CLINIC | Facility: CLINIC | Age: 55
End: 2025-05-16

## 2025-05-16 VITALS
HEIGHT: 67 IN | RESPIRATION RATE: 14 BRPM | BODY MASS INDEX: 28.03 KG/M2 | WEIGHT: 178.6 LBS | SYSTOLIC BLOOD PRESSURE: 114 MMHG | HEART RATE: 88 BPM | DIASTOLIC BLOOD PRESSURE: 78 MMHG | OXYGEN SATURATION: 98 % | TEMPERATURE: 97.2 F

## 2025-05-16 DIAGNOSIS — S30.861A TICK BITE OF ABDOMINAL WALL, INITIAL ENCOUNTER: Primary | ICD-10-CM
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
## 2025-05-16 RX ORDER — DOXYCYCLINE 100 MG/1
100 CAPSULE ORAL EVERY 12 HOURS SCHEDULED
Qty: 42 CAPSULE | Refills: 0 | Status: SHIPPED | OUTPATIENT
Start: 2025-05-16 | End: 2025-06-06

## 2025-05-16 RX ORDER — DIPHENOXYLATE HYDROCHLORIDE AND ATROPINE SULFATE 2.5; .025 MG/1; MG/1
1 TABLET ORAL DAILY
COMMUNITY

## 2025-05-21 NOTE — PROGRESS NOTES
Assessment & Plan  Tick bite of abdominal wall, initial encounter    Orders:    doxycycline hyclate (VIBRAMYCIN) 100 mg capsule; Take 1 capsule (100 mg total) by mouth every 12 (twelve) hours for 21 days        History of Present Illness     Marcie Goncalves is a 55 y.o. female   The patient presents complaining of having removed a tick from the right side of her abdomen approximately 4 days ago.  There is slight redness at the site of the puncture but no bull's-eye rash no fever arthralgia or other symptoms.  We talked about the fact she was out of the 48-hour window for stat dose of antibiotic so she has opted for an entire course to protect herself.    Tick Bite  This is a new problem. The current episode started in the past 7 days. The problem occurs constantly. The problem has been unchanged. Associated symptoms include a rash. Pertinent negatives include no abdominal pain, anorexia, arthralgias, change in bowel habit, chest pain, chills, congestion, coughing, diaphoresis, fatigue, fever, headaches, joint swelling, myalgias, nausea, neck pain, numbness, sore throat, swollen glands, urinary symptoms, vertigo, visual change, vomiting or weakness. Nothing aggravates the symptoms. She has tried nothing for the symptoms. The treatment provided no relief.     Review of Systems   Constitutional:  Negative for appetite change, chills, diaphoresis, fatigue and fever.   HENT:  Negative for congestion, ear pain, facial swelling, rhinorrhea, sinus pain, sore throat and trouble swallowing.    Eyes:  Negative for discharge and redness.   Respiratory:  Negative for cough, chest tightness, shortness of breath and wheezing.    Cardiovascular:  Negative for chest pain and palpitations.   Gastrointestinal:  Negative for abdominal pain, anorexia, change in bowel habit, diarrhea, nausea and vomiting.   Endocrine: Negative for polyuria.   Genitourinary:  Negative for dysuria and urgency.   Musculoskeletal:  Negative for arthralgias,  "back pain, joint swelling, myalgias and neck pain.   Skin:  Positive for rash. Negative for wound.        Positive erythematous puncture wound on the right side of the abdomen lower inner region without bull's-eye markings warmth or drainage   Neurological:  Negative for dizziness, vertigo, weakness, numbness and headaches.   Hematological:  Negative for adenopathy.   Psychiatric/Behavioral:  Negative for behavioral problems, confusion and sleep disturbance.    All other systems reviewed and are negative.    Objective   /78   Pulse 88   Temp (!) 97.2 °F (36.2 °C)   Resp 14   Ht 5' 7\" (1.702 m)   Wt 81 kg (178 lb 9.6 oz)   SpO2 98%   BMI 27.97 kg/m²      Physical Exam  Vitals and nursing note reviewed.   Constitutional:       General: She is not in acute distress.     Appearance: Normal appearance. She is well-developed. She is not ill-appearing or diaphoretic.   HENT:      Head: Normocephalic and atraumatic.      Right Ear: Tympanic membrane, ear canal and external ear normal.      Left Ear: Tympanic membrane, ear canal and external ear normal.      Nose: Nose normal. No congestion or rhinorrhea.      Mouth/Throat:      Mouth: Mucous membranes are moist.      Pharynx: Oropharynx is clear. No oropharyngeal exudate or posterior oropharyngeal erythema.     Eyes:      General: No scleral icterus.        Right eye: No discharge.         Left eye: No discharge.      Extraocular Movements: Extraocular movements intact.      Conjunctiva/sclera: Conjunctivae normal.      Pupils: Pupils are equal, round, and reactive to light.     Neck:      Thyroid: No thyromegaly.      Vascular: No carotid bruit or JVD.      Trachea: No tracheal deviation.     Cardiovascular:      Rate and Rhythm: Normal rate and regular rhythm.      Pulses: Normal pulses.      Heart sounds: Normal heart sounds. No murmur heard.  Pulmonary:      Effort: Pulmonary effort is normal. No respiratory distress.      Breath sounds: Normal breath " sounds. No stridor. No wheezing, rhonchi or rales.   Abdominal:      General: Abdomen is flat. Bowel sounds are normal. There is no distension.      Palpations: Abdomen is soft. There is no mass.      Tenderness: There is no abdominal tenderness. There is no guarding or rebound.     Musculoskeletal:         General: No swelling, tenderness or deformity. Normal range of motion.      Cervical back: Normal range of motion and neck supple. No rigidity.      Right lower leg: No edema.      Left lower leg: No edema.   Lymphadenopathy:      Cervical: No cervical adenopathy.     Skin:     General: Skin is warm and dry.      Capillary Refill: Capillary refill takes less than 2 seconds.      Coloration: Skin is not jaundiced.      Findings: Lesion present. No bruising, erythema or rash.      Comments: Erythematous small puncture room in the right lower abdominal area anteriorly without purulent drainage without bull's-eye configuration or warmth or tenderness on palpation     Neurological:      General: No focal deficit present.      Mental Status: She is alert and oriented to person, place, and time.      Cranial Nerves: No cranial nerve deficit.      Sensory: No sensory deficit.      Motor: No abnormal muscle tone.      Coordination: Coordination normal.      Gait: Gait normal.      Deep Tendon Reflexes: Reflexes are normal and symmetric. Reflexes normal.     Psychiatric:         Mood and Affect: Mood normal.         Behavior: Behavior normal.         Thought Content: Thought content normal.         Judgment: Judgment normal.

## 2025-05-22 ENCOUNTER — TELEPHONE (OUTPATIENT)
Dept: FAMILY MEDICINE CLINIC | Facility: CLINIC | Age: 55
End: 2025-05-22

## 2025-05-22 DIAGNOSIS — E66.09 OBESITY DUE TO EXCESS CALORIES WITHOUT SERIOUS COMORBIDITY, UNSPECIFIED CLASS: Primary | ICD-10-CM

## 2025-05-22 RX ORDER — TIRZEPATIDE 10 MG/.5ML
10 INJECTION, SOLUTION SUBCUTANEOUS WEEKLY
Qty: 2 ML | Refills: 0 | Status: SHIPPED | OUTPATIENT
Start: 2025-05-22

## 2025-05-22 NOTE — TELEPHONE ENCOUNTER
Patient called to request next dose of zepbound  How are you tolerating the medication?   [] Nausea  [] Vomiting  [] Diarrhea  [x] Asymptomatic  [] Other:    Last visit weight: 182#    Current weight: 178#    Date of last injection: 5/19/25    How many injections do you have left: 0    [facility] [address]

## 2025-06-06 ENCOUNTER — TELEPHONE (OUTPATIENT)
Age: 55
End: 2025-06-06

## 2025-06-06 NOTE — TELEPHONE ENCOUNTER
Caller: Martina from Dr Wheeler's office    Doctor: Dr. Torres    Reason for call: requesting peer to peer    Call back#: [phone]

## 2025-06-10 ENCOUNTER — TELEPHONE (OUTPATIENT)
Age: 55
End: 2025-06-10

## 2025-06-10 ENCOUNTER — TELEPHONE (OUTPATIENT)
Dept: OBGYN CLINIC | Facility: CLINIC | Age: 55
End: 2025-06-10

## 2025-06-10 NOTE — TELEPHONE ENCOUNTER
Caller: Martina / Dr Wheeler Office     Doctor: Dr. Torres / [place]     Reason for call: [place] is requesting a P2P if there is anything to add to Disability Claim regarding limitations and restrictions for the patient.    Just a note:   Patient is scheduled for 6/23 to discuss limitations and restrictions with the provider.    Please call number below for P2P .       Call back#: [phone]

## 2025-06-10 NOTE — TELEPHONE ENCOUNTER
Caller: [facility] Medical review    Doctor: Dr. torres    Reason for call: would like to have a P2P  with Dr Torres regarding patient WC disability .   Please call number below  Thank you    Call back#: [phone]

## 2025-06-13 ENCOUNTER — VBI (OUTPATIENT)
Dept: ADMINISTRATIVE | Facility: OTHER | Age: 55
End: 2025-06-13

## 2025-06-13 NOTE — TELEPHONE ENCOUNTER
06/13/25 10:23 AM    The patient was called for Osteoporosis Management Post Fracture and a message was left for the patient to return the call to the VBI Department at [facility]: Phone [phone].    Thank you.  Skip Beauchamp MA  [facility]

## 2025-06-16 ENCOUNTER — TELEPHONE (OUTPATIENT)
Dept: FAMILY MEDICINE CLINIC | Facility: CLINIC | Age: 55
End: 2025-06-16

## 2025-06-16 DIAGNOSIS — E66.09 OBESITY DUE TO EXCESS CALORIES WITHOUT SERIOUS COMORBIDITY, UNSPECIFIED CLASS: Primary | ICD-10-CM

## 2025-06-16 RX ORDER — TIRZEPATIDE 12.5 MG/.5ML
12.5 INJECTION, SOLUTION SUBCUTANEOUS WEEKLY
Qty: 2 ML | Refills: 0 | Status: SHIPPED | OUTPATIENT
Start: 2025-06-16

## 2025-06-16 NOTE — TELEPHONE ENCOUNTER
Patient called the RX Refill Line. Message is being forwarded to the office.     Patient is requesting a refill on Zepbound but would like to increase to the next dose. Please send script to [facility] in [place]     Please contact patient at [phone]

## 2025-06-19 DIAGNOSIS — F41.9 ANXIETY: ICD-10-CM

## 2025-06-19 RX ORDER — DESVENLAFAXINE 50 MG/1
50 TABLET, FILM COATED, EXTENDED RELEASE ORAL DAILY
Qty: 90 TABLET | Refills: 1 | Status: SHIPPED | OUTPATIENT
Start: 2025-06-19

## 2025-07-01 ENCOUNTER — OFFICE VISIT (OUTPATIENT)
Dept: OBGYN CLINIC | Facility: CLINIC | Age: 55
End: 2025-07-01
Payer: COMMERCIAL

## 2025-07-01 VITALS — WEIGHT: 168 LBS | HEIGHT: 67 IN | BODY MASS INDEX: 26.37 KG/M2

## 2025-07-01 DIAGNOSIS — S82.62XD CLOSED AVULSION FRACTURE OF LATERAL MALLEOLUS OF LEFT FIBULA WITH ROUTINE HEALING, SUBSEQUENT ENCOUNTER: Primary | ICD-10-CM

## 2025-07-01 DIAGNOSIS — S93.402A SPRAIN OF UNSPECIFIED LIGAMENT OF LEFT ANKLE, INITIAL ENCOUNTER: ICD-10-CM

## 2025-07-01 PROCEDURE — 99213 OFFICE O/P EST LOW 20 MIN: CPT | Performed by: ORTHOPAEDIC SURGERY

## 2025-07-01 NOTE — LETTER
July 1, 2025     Patient: Marcie Goncalves  YOB: 1970  Date of Visit: 7/1/2025      To Whom it May Concern:    Marcie Goncalves is under my professional care. Marcie was seen in my office on 7/1/2025. Marcie may return to work with limitations allow 15 minutes rest for every 2 hours of work, allow her to weak sneakers as needed, allow her to miss work for physical therapy sessions.    If you have any questions or concerns, please don't hesitate to call.         Sincerely,          Justin Torres MD        CC: No Recipients

## 2025-07-01 NOTE — PROGRESS NOTES
Name: Marcie Goncalves      : 1970      MRN: [id number]  Encounter Provider: Justin Torres MD  Encounter Date: 2025   Encounter department: [facility]      REASON FOR FOLLOW-UP  Marcie Goncalves is a 55 y.o. female who presents for follow-up of the left Ankle lateral malleolus avulsion fracture DOI 24    HISTORY OF PRESENT ILLNESS  Following our last visit, we decided to initially treat her Ankle symptoms non-operatively. Our plan was to manage their symptoms conservatively with Rest, Ice, Compression, Elevation, Activity Modification, Anti-inflammatory Medication, and Physical Therapy. Today, patient comes in for further evaluation and for updated work note. She has not been able to start Physical Therapy due to scheduling difficulties. She notes continued mild lateral ankle pain, weakness at the end of the day, stumbling at the end of the day due to weakness. She also presents to update her work restrictions placed at her last visit.        Musculoskeletal: Left Ankle   Skin Intact               Minimal swelling/ecchymosis over lateral ankle              TTP: ATF   Range of motion and strength, especially eversion, limited due to pain and stiffness   Sensation intact throughout Superficial peroneal, Deep peroneal, Tibial, Sural, Saphenous distributions              EHL/TA/PF motor function intact to testing.               BCR              Gait: Normal       DIAGNOSTIC IMAGING:  No new imaging to review today    Assessment & Plan  Closed avulsion fracture of lateral malleolus of left fibula with routine healing, subsequent encounter  Sprain of unspecified ligament of left ankle, initial encounter            Discussed continued non-operative management of her ankle injury  Initiate PT for strengthening, proprioception  Updated work restriction documentation signed today   Consider FCE if further restrictions are required  Follow up as needed for  re-evaluation    Scribe Attestation      I,:  Chinyere Livingston am acting as a scribe while in the presence of the attending physician.:       I,:  Justin Torres MD personally performed the services described in this documentation    as scribed in my presence.:

## 2025-07-01 NOTE — Clinical Note
July 1, 2025     Patient: Marcie Goncalves  YOB: 1970  Date of Visit: 7/1/2025      To Whom it May Concern:    Marcie Goncalves is under my professional care. Marcie was seen in my office on 7/1/2025. Marcie {Return to school/sport/work:[id number]}.    If you have any questions or concerns, please don't hesitate to call.         Sincerely,          Justin Torres MD        CC: No Recipients

## 2025-07-17 DIAGNOSIS — E66.09 OBESITY DUE TO EXCESS CALORIES WITHOUT SERIOUS COMORBIDITY, UNSPECIFIED CLASS: ICD-10-CM

## 2025-07-18 ENCOUNTER — TELEPHONE (OUTPATIENT)
Age: 55
End: 2025-07-18

## 2025-07-18 RX ORDER — SEMAGLUTIDE 0.25 MG/.5ML
INJECTION, SOLUTION SUBCUTANEOUS
Refills: 0 | OUTPATIENT
Start: 2025-07-18

## 2025-07-18 NOTE — TELEPHONE ENCOUNTER
Pa needed       Wegovy 0.25 MG/0.5ML       Changed from: tirzepatide (Zepbound) 15 mg/0.5 mL auto-injector   All pharmacy suggested alternatives are listed below   Sig: N/A   Disp: Not specified    Refills: 0   Start: 7/17/2025   Class: Normal   For: Obesity due to excess calories without serious comorbidity, unspecified class   Last ordered: Yesterday (7/17/2025) by Yola Gerard,    Last refill: 7/17/2025   Rx #: [id number]   Prior authorization request will be sent when the order is signed.   Pharmacy comment: Alternative Requested:INSURANCE COVERS WEGOVY, QSYMIA, OR SAXENDA.  All Pharmacy Suggested Alternatives:  ? Semaglutide-Weight Management (Wegovy) 0.25 MG/0.5ML  ? liraglutide (Saxenda) injection  To prescribe one of the alternatives listed above, open the encounter and click Replace.  Open Encounter     Endocrinology:  Diabetes - GLP-1 Receptor Agonists Lxxxke9307/17/2025 04:28 PM  Protocol Details Valid encounter within last 6 months  [facility] Embedded Ontnonn7107/17/2025 04:28 PM  The requested medication is not on the active medication list.    This request has changes from the previous prescription.  To be filled at: [facility]/pharmacy #[id number] - [place] - [address]

## 2025-07-21 DIAGNOSIS — E66.09 OBESITY DUE TO EXCESS CALORIES WITHOUT SERIOUS COMORBIDITY, UNSPECIFIED CLASS: ICD-10-CM

## 2025-07-21 RX ORDER — SEMAGLUTIDE 2.4 MG/.75ML
INJECTION, SOLUTION SUBCUTANEOUS
Qty: 3 ML | Refills: 0 | Status: SHIPPED | OUTPATIENT
Start: 2025-07-21

## 2025-07-21 NOTE — TELEPHONE ENCOUNTER
Patient's insurance no longer covers Zepbound, but she received a letter stating that they will cover Wegovy. She needs to switch to Wegovy and this will need auth. The 0.25 mg starting dose was pended to you, but I'm not sure if this is appropriate as she was on the 15 mg of Zepbound up until now.

## 2025-07-21 NOTE — TELEPHONE ENCOUNTER
Patient will need auth on Wegovy 2.4 mg. She is starting at the highest dose because she is transitioning from  Zepbound 15 mg. Change is being made due to insurance changes.

## 2025-07-21 NOTE — TELEPHONE ENCOUNTER
This medication is not listed on the patient's current medication list. Please have provider prescribe this medication and send it to the patient's chosen pharmacy. Thank you.

## 2025-07-22 DIAGNOSIS — E66.09 OBESITY DUE TO EXCESS CALORIES WITHOUT SERIOUS COMORBIDITY, UNSPECIFIED CLASS: Primary | ICD-10-CM

## 2025-07-22 RX ORDER — SEMAGLUTIDE 2.4 MG/.75ML
INJECTION, SOLUTION SUBCUTANEOUS
Qty: 3 ML | Refills: 0 | Status: SHIPPED | OUTPATIENT
Start: 2025-07-22

## 2025-07-25 DIAGNOSIS — E78.00 HYPERCHOLESTEROLEMIA: ICD-10-CM

## 2025-07-28 ENCOUNTER — OFFICE VISIT (OUTPATIENT)
Dept: CARDIOLOGY CLINIC | Facility: CLINIC | Age: 55
End: 2025-07-28
Payer: COMMERCIAL

## 2025-07-28 ENCOUNTER — EVALUATION (OUTPATIENT)
Dept: PHYSICAL THERAPY | Facility: CLINIC | Age: 55
End: 2025-07-28
Payer: COMMERCIAL

## 2025-07-28 VITALS
HEIGHT: 67 IN | HEART RATE: 74 BPM | SYSTOLIC BLOOD PRESSURE: 100 MMHG | WEIGHT: 162.1 LBS | OXYGEN SATURATION: 99 % | DIASTOLIC BLOOD PRESSURE: 70 MMHG | BODY MASS INDEX: 25.44 KG/M2

## 2025-07-28 DIAGNOSIS — S82.62XD CLOSED AVULSION FRACTURE OF LATERAL MALLEOLUS OF LEFT FIBULA WITH ROUTINE HEALING, SUBSEQUENT ENCOUNTER: Primary | ICD-10-CM

## 2025-07-28 DIAGNOSIS — E78.2 MIXED HYPERLIPIDEMIA: Primary | ICD-10-CM

## 2025-07-28 DIAGNOSIS — R01.1 HEART MURMUR: ICD-10-CM

## 2025-07-28 DIAGNOSIS — S93.402D SPRAIN OF LIGAMENT OF LEFT ANKLE, SUBSEQUENT ENCOUNTER: ICD-10-CM

## 2025-07-28 DIAGNOSIS — M25.572 LEFT ANKLE PAIN, UNSPECIFIED CHRONICITY: ICD-10-CM

## 2025-07-28 PROCEDURE — 99214 OFFICE O/P EST MOD 30 MIN: CPT | Performed by: NURSE PRACTITIONER

## 2025-07-28 PROCEDURE — 93000 ELECTROCARDIOGRAM COMPLETE: CPT | Performed by: NURSE PRACTITIONER

## 2025-07-28 PROCEDURE — 97110 THERAPEUTIC EXERCISES: CPT

## 2025-07-28 RX ORDER — ROSUVASTATIN CALCIUM 10 MG/1
10 TABLET, COATED ORAL DAILY
Qty: 90 TABLET | Refills: 1 | Status: SHIPPED | OUTPATIENT
Start: 2025-07-28

## 2025-07-29 LAB
ATRIAL RATE: 74 BPM
P AXIS: 61 DEGREES
PR INTERVAL: 130 MS
QRS AXIS: 60 DEGREES
QRSD INTERVAL: 92 MS
QT INTERVAL: 400 MS
QTC INTERVAL: 444 MS
T WAVE AXIS: 78 DEGREES
VENTRICULAR RATE: 74 BPM

## 2025-08-07 ENCOUNTER — OFFICE VISIT (OUTPATIENT)
Dept: PHYSICAL THERAPY | Facility: CLINIC | Age: 55
End: 2025-08-07
Payer: COMMERCIAL

## 2025-08-07 DIAGNOSIS — M25.572 LEFT ANKLE PAIN, UNSPECIFIED CHRONICITY: ICD-10-CM

## 2025-08-07 DIAGNOSIS — S93.402D SPRAIN OF LIGAMENT OF LEFT ANKLE, SUBSEQUENT ENCOUNTER: ICD-10-CM

## 2025-08-07 DIAGNOSIS — S82.62XD CLOSED AVULSION FRACTURE OF LATERAL MALLEOLUS OF LEFT FIBULA WITH ROUTINE HEALING, SUBSEQUENT ENCOUNTER: Primary | ICD-10-CM

## 2025-08-07 PROCEDURE — 97140 MANUAL THERAPY 1/> REGIONS: CPT

## 2025-08-07 PROCEDURE — 97112 NEUROMUSCULAR REEDUCATION: CPT

## 2025-08-12 ENCOUNTER — OFFICE VISIT (OUTPATIENT)
Dept: PHYSICAL THERAPY | Facility: CLINIC | Age: 55
End: 2025-08-12
Payer: COMMERCIAL

## 2025-08-14 ENCOUNTER — OFFICE VISIT (OUTPATIENT)
Dept: PHYSICAL THERAPY | Facility: CLINIC | Age: 55
End: 2025-08-14
Payer: COMMERCIAL

## 2025-08-16 DIAGNOSIS — M54.17 LUMBOSACRAL RADICULOPATHY: ICD-10-CM

## 2025-08-18 RX ORDER — DICLOFENAC SODIUM 75 MG/1
75 TABLET, DELAYED RELEASE ORAL 2 TIMES DAILY
Qty: 60 TABLET | Refills: 0 | Status: SHIPPED | OUTPATIENT
Start: 2025-08-18

## 2025-08-19 ENCOUNTER — OFFICE VISIT (OUTPATIENT)
Dept: PHYSICAL THERAPY | Facility: CLINIC | Age: 55
End: 2025-08-19
Payer: COMMERCIAL

## 2025-08-19 DIAGNOSIS — M25.572 LEFT ANKLE PAIN, UNSPECIFIED CHRONICITY: ICD-10-CM

## 2025-08-19 DIAGNOSIS — S93.402D SPRAIN OF LIGAMENT OF LEFT ANKLE, SUBSEQUENT ENCOUNTER: ICD-10-CM

## 2025-08-19 DIAGNOSIS — S82.62XD CLOSED AVULSION FRACTURE OF LATERAL MALLEOLUS OF LEFT FIBULA WITH ROUTINE HEALING, SUBSEQUENT ENCOUNTER: Primary | ICD-10-CM

## 2025-08-19 PROCEDURE — 97140 MANUAL THERAPY 1/> REGIONS: CPT

## 2025-08-19 PROCEDURE — 97110 THERAPEUTIC EXERCISES: CPT

## 2025-08-19 PROCEDURE — 97112 NEUROMUSCULAR REEDUCATION: CPT
